# Patient Record
Sex: MALE | Race: WHITE | Employment: FULL TIME | URBAN - METROPOLITAN AREA
[De-identification: names, ages, dates, MRNs, and addresses within clinical notes are randomized per-mention and may not be internally consistent; named-entity substitution may affect disease eponyms.]

---

## 2018-01-02 ENCOUNTER — HOSPITAL ENCOUNTER (EMERGENCY)
Facility: CLINIC | Age: 53
Discharge: HOME | End: 2018-01-02
Attending: EMERGENCY MEDICINE | Admitting: EMERGENCY MEDICINE
Payer: COMMERCIAL

## 2018-01-02 VITALS
HEIGHT: 69 IN | HEART RATE: 66 BPM | DIASTOLIC BLOOD PRESSURE: 76 MMHG | RESPIRATION RATE: 20 BRPM | TEMPERATURE: 97.7 F | BODY MASS INDEX: 29.62 KG/M2 | WEIGHT: 200 LBS | SYSTOLIC BLOOD PRESSURE: 117 MMHG | OXYGEN SATURATION: 97 %

## 2018-01-02 DIAGNOSIS — R68.84 JAW PAIN: ICD-10-CM

## 2018-01-02 LAB
ANION GAP SERPL CALCULATED.3IONS-SCNC: 8 MMOL/L (ref 3–14)
BASOPHILS # BLD AUTO: 0 10E9/L (ref 0–0.2)
BASOPHILS NFR BLD AUTO: 0.3 %
BUN SERPL-MCNC: 25 MG/DL (ref 7–30)
CALCIUM SERPL-MCNC: 8.6 MG/DL (ref 8.5–10.1)
CHLORIDE SERPL-SCNC: 107 MMOL/L (ref 94–109)
CO2 SERPL-SCNC: 26 MMOL/L (ref 20–32)
CREAT SERPL-MCNC: 1.11 MG/DL (ref 0.66–1.25)
DIFFERENTIAL METHOD BLD: ABNORMAL
EOSINOPHIL # BLD AUTO: 0.1 10E9/L (ref 0–0.7)
EOSINOPHIL NFR BLD AUTO: 1.4 %
ERYTHROCYTE [DISTWIDTH] IN BLOOD BY AUTOMATED COUNT: 11.4 % (ref 10–15)
GFR SERPL CREATININE-BSD FRML MDRD: 70 ML/MIN/1.7M2
GLUCOSE SERPL-MCNC: 107 MG/DL (ref 70–99)
HCT VFR BLD AUTO: 43.8 % (ref 40–53)
HGB BLD-MCNC: 15.8 G/DL (ref 13.3–17.7)
IMM GRANULOCYTES # BLD: 0 10E9/L (ref 0–0.4)
IMM GRANULOCYTES NFR BLD: 0.2 %
INTERPRETATION ECG - MUSE: NORMAL
LYMPHOCYTES # BLD AUTO: 1.6 10E9/L (ref 0.8–5.3)
LYMPHOCYTES NFR BLD AUTO: 27.1 %
MCH RBC QN AUTO: 33.6 PG (ref 26.5–33)
MCHC RBC AUTO-ENTMCNC: 36.1 G/DL (ref 31.5–36.5)
MCV RBC AUTO: 93 FL (ref 78–100)
MONOCYTES # BLD AUTO: 0.4 10E9/L (ref 0–1.3)
MONOCYTES NFR BLD AUTO: 7.6 %
NEUTROPHILS # BLD AUTO: 3.7 10E9/L (ref 1.6–8.3)
NEUTROPHILS NFR BLD AUTO: 63.4 %
NRBC # BLD AUTO: 0 10*3/UL
NRBC BLD AUTO-RTO: 0 /100
PLATELET # BLD AUTO: 168 10E9/L (ref 150–450)
POTASSIUM SERPL-SCNC: 3.7 MMOL/L (ref 3.4–5.3)
RBC # BLD AUTO: 4.7 10E12/L (ref 4.4–5.9)
SODIUM SERPL-SCNC: 141 MMOL/L (ref 133–144)
TROPONIN I SERPL-MCNC: <0.015 UG/L (ref 0–0.04)
WBC # BLD AUTO: 5.8 10E9/L (ref 4–11)

## 2018-01-02 PROCEDURE — 85025 COMPLETE CBC W/AUTO DIFF WBC: CPT | Performed by: EMERGENCY MEDICINE

## 2018-01-02 PROCEDURE — 99284 EMERGENCY DEPT VISIT MOD MDM: CPT

## 2018-01-02 PROCEDURE — 84484 ASSAY OF TROPONIN QUANT: CPT | Performed by: EMERGENCY MEDICINE

## 2018-01-02 PROCEDURE — 93005 ELECTROCARDIOGRAM TRACING: CPT

## 2018-01-02 PROCEDURE — 80048 BASIC METABOLIC PNL TOTAL CA: CPT | Performed by: EMERGENCY MEDICINE

## 2018-01-02 ASSESSMENT — ENCOUNTER SYMPTOMS
VOMITING: 0
DIARRHEA: 0
NAUSEA: 0
SHORTNESS OF BREATH: 0
CHILLS: 0
FEVER: 0
WOUND: 0
COLOR CHANGE: 0
DIZZINESS: 0
ABDOMINAL PAIN: 0
FATIGUE: 0
SINUS PAIN: 0
LIGHT-HEADEDNESS: 0
DIFFICULTY URINATING: 0
HEMATURIA: 0
SINUS PRESSURE: 0
COUGH: 0
DYSURIA: 0
HEADACHES: 0

## 2018-01-02 NOTE — ED AVS SNAPSHOT
[facility] Emergency Department    [address]    [facility] [address]    Phone:  [phone]    Fax:  [phone]                                       Yoseph Cook   MRN: [id number]    Department:  [facility] Emergency Department   Date of Visit:  1/2/2018           Patient Information     Date Of Birth          1965        Your diagnoses for this visit were:     Jaw pain        You were seen by Amandeep Kennedy MD.      Follow-up Information     Call [facility].    Why:  call later this week if symptoms persisting/getting worse     Contact information:    [address]  [facility] [phone]        Follow up with [facility] Emergency Department.    Specialty:  EMERGENCY MEDICINE    Why:  As needed, If symptoms worsen    Contact information:    [address]  [facility] [id number] [phone]        Follow up with Rashard Pizarro MD.    Specialty:  Internal Medicine    Why:  As needed    Contact information:    [facility]  [address] [place] [address] [phone]        Discharge References/Attachments     PAIN, ACUTE, UNCERTAIN CAUSE (ENGLISH)    TRIGEMINAL NEURALGIA (ENGLISH)      24 Hour Appointment Hotline       To make an appointment at any [facility], call [phone] ([phone]). If you don't have a family doctor or clinic, we will help you find one. [place] clinics are conveniently located to serve the needs of you and your family.          ED Discharge Orders     Exercise Stress Echocardiogram       Administration of IV contrast will be tailored to this examination per the appropriate written protocol listed in the Echocardiography department Protocol Book, or by the supervising Cardiologist. This may result in an order change.    Use of contrast is at the discretion of the supervising Cardiologist.                     Review of your medicines       Our records show that you are taking the medicines listed below. If these are incorrect, please call your family doctor or clinic.        Dose / Directions Last dose taken    ASACOL 400 MG EC tablet   Generic drug:  mesalamine        2 TABLETS 3 TIMES DAILY   Refills:  0        aspirin 81 MG tablet        1 TABLET DAILY   Refills:  0        clobetasol 0.05 % external solution   Commonly known as:  TEMOVATE   Quantity:  1        apply as directed   Refills:  11        fish oil-omega-3 fatty acids 1000 MG capsule        2 tablets daily   Refills:  0        metoprolol 10 mg/mL Susp   Commonly known as:  LOPRESSOR        Take by mouth 2 times daily   Refills:  0        MULTI-DAY Tabs        Refills:  0        NIZORAL 2 % shampoo   Quantity:  1 bottle   Generic drug:  ketoconazole        apply as directed   Refills:  11                Procedures and tests performed during your visit     Basic metabolic panel    CBC with platelets differential    EKG 12 lead    Troponin I      Orders Needing Specimen Collection     None      Pending Results     No orders found from 12/31/2017 to 1/3/2018.            Pending Culture Results     No orders found from 12/31/2017 to 1/3/2018.            Pending Results Instructions     If you had any lab results that were not finalized at the time of your Discharge, you can call the ED Lab Result RN at [phone]. You will be contacted by this team for any positive Lab results or changes in treatment. The nurses are available 7 days a week from 10A to 6:30P.  You can leave a message 24 hours per day and they will return your call.        Test Results From Your Hospital Stay        1/2/2018  3:34 PM      Component Results     Component Value Ref Range & Units Status    WBC 5.8 4.0 - 11.0 10e9/L Final    RBC Count 4.70 4.4 - 5.9 10e12/L Final    Hemoglobin 15.8 13.3 - 17.7 g/dL Final    Hematocrit 43.8 40.0 - 53.0 % Final    MCV 93 78 - 100 fl Final    MCH 33.6 (H) 26.5 - 33.0 pg Final     MCHC 36.1 31.5 - 36.5 g/dL Final    RDW 11.4 10.0 - 15.0 % Final    Platelet Count 168 150 - 450 10e9/L Final    Diff Method Automated Method  Final    % Neutrophils 63.4 % Final    % Lymphocytes 27.1 % Final    % Monocytes 7.6 % Final    % Eosinophils 1.4 % Final    % Basophils 0.3 % Final    % Immature Granulocytes 0.2 % Final    Nucleated RBCs 0 0 /100 Final    Absolute Neutrophil 3.7 1.6 - 8.3 10e9/L Final    Absolute Lymphocytes 1.6 0.8 - 5.3 10e9/L Final    Absolute Monocytes 0.4 0.0 - 1.3 10e9/L Final    Absolute Eosinophils 0.1 0.0 - 0.7 10e9/L Final    Absolute Basophils 0.0 0.0 - 0.2 10e9/L Final    Abs Immature Granulocytes 0.0 0 - 0.4 10e9/L Final    Absolute Nucleated RBC 0.0  Final         1/2/2018  3:52 PM      Component Results     Component Value Ref Range & Units Status    Troponin I ES <0.015 0.000 - 0.045 ug/L Final    The 99th percentile for upper reference range is 0.045 ug/L.  Troponin values   in the range of 0.045 - 0.120 ug/L may be associated with risks of adverse   clinical events.           1/2/2018  3:52 PM      Component Results     Component Value Ref Range & Units Status    Sodium 141 133 - 144 mmol/L Final    Potassium 3.7 3.4 - 5.3 mmol/L Final    Chloride 107 94 - 109 mmol/L Final    Carbon Dioxide 26 20 - 32 mmol/L Final    Anion Gap 8 3 - 14 mmol/L Final    Glucose 107 (H) 70 - 99 mg/dL Final    Urea Nitrogen 25 7 - 30 mg/dL Final    Creatinine 1.11 0.66 - 1.25 mg/dL Final    GFR Estimate 70 >60 mL/min/1.7m2 Final    Non  GFR Calc    GFR Estimate If Black 84 >60 mL/min/1.7m2 Final    African American GFR Calc    Calcium 8.6 8.5 - 10.1 mg/dL Final                Clinical Quality Measure: Blood Pressure Screening     Your blood pressure was checked while you were in the emergency department today. The last reading we obtained was  BP: (!) 137/99 . Please read the guidelines below about what these numbers mean and what you should do about them.  If your systolic  blood pressure (the top number) is less than 120 and your diastolic blood pressure (the bottom number) is less than 80, then your blood pressure is normal. There is nothing more that you need to do about it.  If your systolic blood pressure (the top number) is 120-139 or your diastolic blood pressure (the bottom number) is 80-89, your blood pressure may be higher than it should be. You should have your blood pressure rechecked within a year by a primary care provider.  If your systolic blood pressure (the top number) is 140 or greater or your diastolic blood pressure (the bottom number) is 90 or greater, you may have high blood pressure. High blood pressure is treatable, but if left untreated over time it can put you at risk for heart attack, stroke, or kidney failure. You should have your blood pressure rechecked by a primary care provider within the next 4 weeks.  If your provider in the emergency department today gave you specific instructions to follow-up with your doctor or provider even sooner than that, you should follow that instruction and not wait for up to 4 weeks for your follow-up visit.        Thank you for choosing [facility]       Thank you for choosing [facility] for your care. Our goal is always to provide you with excellent care. Hearing back from our patients is one way we can continue to improve our services. Please take a few minutes to complete the written survey that you may receive in the mail after you visit with us. Thank you!        [employer] Information     [employer] gives you secure access to your electronic health record. If you see a primary care provider, you can also send messages to your care team and make appointments. If you have questions, please call your primary care clinic.  If you do not have a primary care provider, please call [phone] and they will assist you.        Care EveryWhere ID     This is your Care EveryWhere ID. This could be used by other organizations to access  your [facility] medical records  [id number]        Equal Access to Services     ROSETTA WILSON : [place], sandy tineo, [place]. So [facility] [phone].    ATENCIÓN: Si habla español, tiene a candelario disposición servicios gratuitos de asistencia lingüística. Llame al [phone].    We comply with applicable federal civil rights laws and [place] laws. We do not discriminate on the basis of race, color, national origin, age, disability, sex, sexual orientation, or gender identity.            After Visit Summary       This is your record. Keep this with you and show to your community pharmacist(s) and doctor(s) at your next visit.

## 2018-01-02 NOTE — ED PROVIDER NOTES
History     Chief Complaint:  Jaw Pain    HPI   Yoseph Cook is a 52 year old male who presents with jaw pain. The patient states that he woke up this morning at approximately 0800 with left sided jaw pain. The pain is sharp, stabbing, and intermittent in nature, that lasts for only a few seconds at a time. He reports that he originally thought that this was dental pain and visited a dentist to get an examination. At the dentist appointment today he had x-ray done and an examination with cool and warm air blowing onto his teeth with no significant findings. After this visit, his pain migrated to the upper portion of his jaw and further back as well. He states that his jaw is sensitive to cool air but other than this has no other symptoms. Of note, he states that he is a  but he has not flown in 10 days. There was no reported teeth grinding, trauma, fevers, abscess, bony deformities, discharge, foul tastes, or any other symptoms. Of note, he did just get over a cold approximately 2 days prior to today.    Allergies:  No known drug allergies.     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   HENT: Positive for dental problem. Negative for congestion, mouth sores, sinus pain and sinus pressure.    Eyes: Negative for visual disturbance.   Respiratory: Negative for cough and shortness of breath.    Gastrointestinal: Negative for abdominal pain, diarrhea, nausea and vomiting.   Genitourinary: Negative for difficulty urinating, dysuria and hematuria.   Skin: Negative for color change and wound.   Neurological: Negative for dizziness, light-headedness and headaches.   All other systems reviewed and are negative.    Physical Exam     Patient Vitals for the past 24 hrs:   BP Temp Temp src Pulse Resp SpO2 Height Weight   01/02/18 1507 (!) 137/99 97.7  F (36.5  C) Temporal 66 20 99 % 1.753 m (5' 9\") 90.7 kg (200 lb)         Physical Exam  General: Patient is alert and interactive when I enter the room  Head:  The scalp, face, and head appear normal  Eyes:  Conjunctivae are normal  ENT:    No lesions/abscesses intraorally. No reproducible pain to palpation. No salivary stones.   Neck:  Trachea midline. No adenopathy.   CV:  Normal rate  Resp:  No respiratory distress   Musc:  Normal muscular tone    No major joint effusions    No asymmetric leg swelling    Good capillary refill noted  Skin:  No rash or lesions noted  Neuro: Speech is normal and fluent. Face is symmetric.     Moving all extremities well.   Psych:  Awake. Alert.  Normal affect.  Appropriate interactions.            Emergency Department Course   ECG:  @ 1501  Indication: Jaw Pain  Vent. Rate 60 bpm. IL interval 166 ms. QRS duration 98 ms. QT/QTc 398/398 ms. P-R-T axis 64 44 47.   Normal Sinus rhythm. Normal ECG.  No significant change when compared to previous ECG from 11/9/12   Read @ 1716 by Dr. Kennedy.    Laboratory:  CBC:  WBC 5.8, HGB 15.8, ,  BMP: Glucose 107 (H), otherwise WNL (Creatinine 1.11)  (1515) Troponin I: <0.015    Emergency Department Course:  Nursing notes and vitals reviewed.  (6555) I performed an exam of the " patient as documented above.    Blood was drawn from the patient. This was sent for laboratory testing, findings above.   EKG was done, interpretation as above.    Findings and plan explained to the patient. Patient discharged home with instructions regarding supportive care, medications, and reasons to return. The importance of close follow-up was reviewed.  Impression & Plan    Medical Decision Making:  Yoseph Cook is a 52 year old male who came in for jaw pain that started this morning. Upon my presentation, the patients pain was not present. Examination showed no signs of abscess, no facial swelling, no signs of trauma. There is no indication for advanced imaging at this time. Patient has already seen a dentist prior to his presentation in the ED today where x-rays confirmed no indication of fracture or other etiology that could be causing his pain. There was some concern for cardiac etiology as the patient has a history of cardiac complications. EKG and blood work here were both negative for any acute coronary syndrome and therefore believe I can rule out serious cardiac etiology at this point. Given that patient has no fever, abscess, or any other significant findings, I believe the patient can be discharged with outpatient stress testing, as his last was 2 years prior. He will be instructed to follow up with his primary care physician for further workup as well as referral to ENT, and possibly neurology, in the event that this may be related to trigeminal nerve pathology. Overall I suspect pt is suffering from trigeminal neuralgia, but pt declines treatment with carbamazepine or gabapentin at this time due to his job. Patient discharged in stable condition. All questions answered prior to discharge.     Diagnosis:    ICD-10-CM   1. Jaw pain R68.84       Disposition:  Patient is discharged to home.        Jared ACE, am serving as a scribe on 1/2/2018 at 4:48 PM to personally document services  performed by Dr. Kennedy based on my observations and the provider's statements to me.        Jared Fine  1/2/2018   [facility] EMERGENCY DEPARTMENT       Amandeep Kennedy MD  01/04/18 0124

## 2018-01-02 NOTE — ED NOTES
ABCs intact. Pt c/o L lower jaw pain for about a week. Pt went to his dentist today but his teeth were okay. Pt is an . Denies SOB or CP.

## 2018-01-02 NOTE — ED AVS SNAPSHOT
[facility] Emergency Department    [address]    [facility] [address]    Phone:  [phone]    Fax:  [phone]                                       Yoseph Cook   MRN: [id number]    Department:  [facility] Emergency Department   Date of Visit:  1/2/2018           After Visit Summary Signature Page     I have received my discharge instructions, and my questions have been answered. I have discussed any challenges I see with this plan with the nurse or doctor.    ..........................................................................................................................................  Patient/Patient Representative Signature      ..........................................................................................................................................  Patient Representative Print Name and Relationship to Patient    ..................................................               ................................................  Date                                            Time    ..........................................................................................................................................  Reviewed by Signature/Title    ...................................................              ..............................................  Date                                                            Time

## 2018-01-29 ENCOUNTER — OFFICE VISIT (OUTPATIENT)
Dept: URGENT CARE | Facility: URGENT CARE | Age: 53
End: 2018-01-29
Payer: COMMERCIAL

## 2018-01-29 VITALS
WEIGHT: 200 LBS | OXYGEN SATURATION: 98 % | HEART RATE: 58 BPM | TEMPERATURE: 97.7 F | SYSTOLIC BLOOD PRESSURE: 115 MMHG | BODY MASS INDEX: 29.53 KG/M2 | DIASTOLIC BLOOD PRESSURE: 78 MMHG

## 2018-01-29 DIAGNOSIS — J20.9 ACUTE BRONCHITIS, UNSPECIFIED ORGANISM: ICD-10-CM

## 2018-01-29 DIAGNOSIS — J10.1 INFLUENZA A: Primary | ICD-10-CM

## 2018-01-29 LAB
FLUAV+FLUBV AG SPEC QL: NEGATIVE
FLUAV+FLUBV AG SPEC QL: POSITIVE
SPECIMEN SOURCE: ABNORMAL

## 2018-01-29 PROCEDURE — 99203 OFFICE O/P NEW LOW 30 MIN: CPT | Performed by: PHYSICIAN ASSISTANT

## 2018-01-29 PROCEDURE — 87804 INFLUENZA ASSAY W/OPTIC: CPT | Performed by: PHYSICIAN ASSISTANT

## 2018-01-29 NOTE — NURSING NOTE
"Chief Complaint   Patient presents with     Urgent Care     Cough     Pt states was dx with bronchitis x 4 days ago but having fever and vertigo.        Initial /78 (BP Location: Right arm, Patient Position: Chair, Cuff Size: Adult Large)  Pulse 58  Temp 97.7  F (36.5  C) (Tympanic)  Wt 200 lb (90.7 kg)  SpO2 98%  BMI 29.53 kg/m2 Estimated body mass index is 29.53 kg/(m^2) as calculated from the following:    Height as of 1/2/18: 5' 9\" (1.753 m).    Weight as of this encounter: 200 lb (90.7 kg).  Medication Reconciliation: unable or not appropriate to perform   Suman Cantrell CMA (AAMA) 1/29/2018 9:59 AM    "

## 2018-01-29 NOTE — MR AVS SNAPSHOT
After Visit Summary   1/29/2018    Yoseph Cook    MRN: [id number]           Patient Information     Date Of Birth          1965        Visit Information        Provider Department      1/29/2018 9:40 AM Najma Lewis PA-C [facility] Urgent [facility]        Today's Diagnoses     Influenza A    -  1    Acute bronchitis, unspecified organism           Follow-ups after your visit        Who to contact     If you have questions or need follow up information about today's clinic visit or your schedule please contact [facility] URGENT CARE directly at [phone].  Normal or non-critical lab and imaging results will be communicated to you by [employer], letter or phone within 4 business days after the clinic has received the results. If you do not hear from us within 7 days, please contact the clinic through [employer] or phone. If you have a critical or abnormal lab result, we will notify you by phone as soon as possible.  Submit refill requests through [employer] or call your pharmacy and they will forward the refill request to us. Please allow 3 business days for your refill to be completed.          Additional Information About Your Visit        MyChart Information     [employer] gives you secure access to your electronic health record. If you see a primary care provider, you can also send messages to your care team and make appointments. If you have questions, please call your primary care clinic.  If you do not have a primary care provider, please call [phone] and they will assist you.        Care EveryWhere ID     This is your Care EveryWhere ID. This could be used by other organizations to access your [place] medical records  [id number]        Your Vitals Were     Pulse Temperature Pulse Oximetry BMI (Body Mass Index)          58 97.7  F (36.5  C) (Tympanic) 98% 29.53 kg/m2         Blood Pressure from Last 3 Encounters:   01/29/18 115/78   01/02/18 117/76   10/07/16 (!) 130/93     Weight from Last 3 Encounters:   01/29/18 200 lb (90.7 kg)   01/02/18 200 lb (90.7 kg)   10/07/16 200 lb (90.7 kg)              We Performed the Following     Influenza A/B antigen        Primary Care Provider Office Phone # Fax #    Rashard Brian Pizarro [phone]       [facility] [address] [place] [address]        Equal Access to Services     : Hadii aad ku hadasho Soomaali, waaxda luqadaha, qaybta kaalmada adeegyada, waxay idiin hayaan adeeg kharasedrick la'teo . So [facility] [phone].    ATENCIÓN: Si habla español, tiene a candelario disposición servicios gratuitos de asistencia lingüística. [facility] [phone].    We comply with applicable federal civil rights laws and [place] laws. We do not discriminate on the basis of race, color, national origin, age, disability, sex, sexual orientation, or gender identity.            Thank you!     Thank you for choosing [facility] URGENT CARE  for your care. Our goal is always to provide you with excellent care. Hearing back from our patients is one way we can continue to improve our services. Please take a few minutes to complete the written survey that you may receive in the mail after your visit with us. Thank you!             Your Updated Medication List - Protect others around you: Learn how to safely use, store and throw away your medicines at www.disposemymeds.org.          This list is accurate as of 1/29/18 10:48 AM.  Always use your most recent med list.                   Brand Name Dispense Instructions for use Diagnosis    ASACOL 400 MG EC tablet   Generic drug:  mesalamine      2 TABLETS 3 TIMES DAILY    Regional enteritis of small intestine (H)       aspirin 81 MG tablet      1 TABLET DAILY    Routine general medical examination at a health care facility       clobetasol 0.05 % external solution    TEMOVATE    1    apply as directed    Other specified disease of hair and hair follicles       fish oil-omega-3 fatty  acids 1000 MG capsule      2 tablets daily        metoprolol 10 mg/mL Susp    LOPRESSOR     Take by mouth 2 times daily        MULTI-DAY Tabs           NIZORAL 2 % shampoo   Generic drug:  ketoconazole     1 bottle    apply as directed    Other specified disease of hair and hair follicles

## 2018-01-29 NOTE — PROGRESS NOTES
SUBJECTIVE:  Yoseph Cook is a 52 year old male who presents to the clinic today with a chief complaint of cough  and wheezing for 5 day(s).  His cough is described as persistent, nonproductive and wheezing.    The patient's symptoms are moderate and improving.  Associated symptoms include fever. Patient is concerned because he continues to have a fever.The patient's symptoms are exacerbated by no particular triggers  Patient has been using ibuprofen  to improve symptoms. He was seen on Friday and been taking Azithromycin and was given a steroid shot. He has been feeling better but is concerned about having a fever.     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     Atrial fibrillation (H)      Cervicalgia 9/2003    C5 disc herniation     Colitis      Diverticulitis        Current Outpatient Prescriptions   Medication Sig Dispense Refill     metoprolol (LOPRESSOR) 10 mg/mL Take by mouth 2 times daily       NIZORAL 2 % EX SHAM apply as directed 1 bottle 11     CLOBETASOL PROPIONATE 0.05 % EX SOLN apply as directed 1 11     ASACOL 400 MG OR TBEC 2 TABLETS 3 TIMES DAILY       ASPIRIN 81 MG OR TABS 1 TABLET DAILY       FISH OIL 1000 MG OR CAPS 2 tablets daily  0     MULTI-DAY OR TABS          Social History   Substance Use Topics     Smoking status: Former Smoker     Smokeless tobacco: Never Used     Alcohol use Yes      Comment:  pack per month     ROS:  C: POSITIVE for fever  INTEGUMENTARY/SKIN: NEGATIVE for worrisome rashes, moles or lesions  E/M: NEGATIVE for sore throat, ear or sinus problems  R: POSITIVE for cough  CV: NEGATIVE for chest pain, palpitations or peripheral edema  GI: NEGATIVE for nausea, abdominal pain, heartburn, or change in bowel habits  HEME/ALLERGY/IMMUNE: NEGATIVE for swollen nodes      OBJECTIVE:  /78 (BP  Location: Right arm, Patient Position: Chair, Cuff Size: Adult Large)  Pulse 58  Temp 97.7  F (36.5  C) (Tympanic)  Wt 200 lb (90.7 kg)  SpO2 98%  BMI 29.53 kg/m2  GENERAL APPEARANCE: healthy, alert and no distress  EYES: EOMI,  PERRL, conjunctiva clear  HENT: ear canals and TM's normal.  Nose and mouth without ulcers, erythema or lesions  NECK: supple, nontender, no lymphadenopathy  RESP: expiratory wheezes upper fields  CV: regular rates and rhythm, normal S1 S2, no murmur noted  NEURO: Normal strength and tone, sensory exam grossly normal,  normal speech and mentation  SKIN: no suspicious lesions or rashes    Results for orders placed or performed in visit on 01/29/18   Influenza A/B antigen   Result Value Ref Range    Influenza A/B Agn Specimen Nasal     Influenza A Positive (A) NEG^Negative    Influenza B Negative NEG^Negative       ASSESSMENT/PLAN:  1. Influenza A  Out of the window for Tamiflu. Went over contact precautions.    2. Acute bronchitis, unspecified organism  Symptomatic measures encouraged, humidified air, plenty of fluids.  Continue taking Azithromycin, RTC if wheezing re-presents.   I have discussed the patient's diagnosis and my plan of treatment with the patient. We went over any labs or imaging. Patient is aware to come back in with worsening symptoms or if no relief despite treatment plan.  Patient verbalizes understanding. All questions were addressed and answered.     Najma Lewis PA-C

## 2018-02-19 ENCOUNTER — RADIANT APPOINTMENT (OUTPATIENT)
Dept: GENERAL RADIOLOGY | Facility: CLINIC | Age: 53
End: 2018-02-19
Attending: PHYSICIAN ASSISTANT
Payer: COMMERCIAL

## 2018-02-19 ENCOUNTER — OFFICE VISIT (OUTPATIENT)
Dept: URGENT CARE | Facility: URGENT CARE | Age: 53
End: 2018-02-19
Payer: COMMERCIAL

## 2018-02-19 VITALS
HEART RATE: 64 BPM | TEMPERATURE: 97.9 F | DIASTOLIC BLOOD PRESSURE: 68 MMHG | OXYGEN SATURATION: 97 % | WEIGHT: 203.3 LBS | BODY MASS INDEX: 30.02 KG/M2 | RESPIRATION RATE: 20 BRPM | SYSTOLIC BLOOD PRESSURE: 104 MMHG

## 2018-02-19 DIAGNOSIS — R05.9 COUGH: ICD-10-CM

## 2018-02-19 DIAGNOSIS — J06.9 VIRAL UPPER RESPIRATORY TRACT INFECTION: Primary | ICD-10-CM

## 2018-02-19 PROCEDURE — 71046 X-RAY EXAM CHEST 2 VIEWS: CPT

## 2018-02-19 PROCEDURE — 99213 OFFICE O/P EST LOW 20 MIN: CPT | Performed by: PHYSICIAN ASSISTANT

## 2018-02-19 RX ORDER — BENZONATATE 200 MG/1
200 CAPSULE ORAL 3 TIMES DAILY PRN
Qty: 21 CAPSULE | Refills: 0 | Status: SHIPPED | OUTPATIENT
Start: 2018-02-19 | End: 2019-07-11

## 2018-02-19 RX ORDER — ALBUTEROL SULFATE 90 UG/1
2 AEROSOL, METERED RESPIRATORY (INHALATION) EVERY 6 HOURS PRN
Qty: 1 INHALER | Refills: 0 | Status: SHIPPED | OUTPATIENT
Start: 2018-02-19 | End: 2019-12-20

## 2018-02-19 ASSESSMENT — ENCOUNTER SYMPTOMS
HEMOPTYSIS: 0
NAUSEA: 0
SORE THROAT: 0
MYALGIAS: 0
FEVER: 0
SINUS PAIN: 0
COUGH: 1
SPUTUM PRODUCTION: 0
WHEEZING: 1
VOMITING: 0
CHILLS: 0
HEARTBURN: 0
SHORTNESS OF BREATH: 0
HEADACHES: 0
PALPITATIONS: 0

## 2018-02-19 NOTE — PROGRESS NOTES
SUBJECTIVE:   Yoseph Cook is a 52 year old male presenting with a chief complaint of   Chief Complaint   Patient presents with     Urgent Care     URI     Deep Ratling in chest While Coughing x One Week    .    Onset of symptoms was 1 week(s) ago.  Course of illness is worsening.    Severity moderate  Current and Associated symptoms: cough - non-productive and wheezing  Treatment measures tried include Fluids and Rest.  Predisposing factors include None. Recent influenza     Patient works as a , flying between  and [place]        Review of Systems   Constitutional: Positive for malaise/fatigue. Negative for chills and fever.   HENT: Negative for congestion, ear pain, sinus pain and sore throat.    Respiratory: Positive for cough and wheezing. Negative for hemoptysis, sputum production and shortness of breath.    Cardiovascular: Negative for chest pain and palpitations.   Gastrointestinal: Negative for heartburn, nausea and vomiting.   Musculoskeletal: Negative for myalgias.   Skin: Negative for rash.   Neurological: Negative for headaches.         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     Atrial fibrillation (H)      Cervicalgia 9/2003    C5 disc herniation     Colitis      Diverticulitis      Current Outpatient Prescriptions   Medication Sig Dispense Refill     OMEPRAZOLE PO        albuterol (PROAIR HFA/PROVENTIL HFA/VENTOLIN HFA) 108 (90 BASE) MCG/ACT Inhaler Inhale 2 puffs into the lungs every 6 hours as needed for shortness of breath / dyspnea or wheezing 1 Inhaler 0     benzonatate (TESSALON) 200 MG capsule Take 1 capsule (200 mg) by mouth 3 times daily as needed for cough 21 capsule 0     metoprolol (LOPRESSOR) 10 mg/mL Take by mouth 2 times daily       ASPIRIN 81 MG OR TABS 1 TABLET DAILY       FISH OIL 1000 MG OR CAPS  2 tablets daily  0     MULTI-DAY OR TABS        NIZORAL 2 % EX SHAM apply as directed (Patient not taking: No sig reported) 1 bottle 11     CLOBETASOL PROPIONATE 0.05 % EX SOLN apply as directed (Patient not taking: No sig reported) 1 11     ASACOL 400 MG OR TBEC 2 TABLETS 3 TIMES DAILY       Social History   Substance Use Topics     Smoking status: Former Smoker     Smokeless tobacco: Never Used     Alcohol use Yes      Comment:  pack per month       OBJECTIVE  /68 (BP Location: Right arm, Patient Position: Sitting, Cuff Size: Adult Large)  Pulse 64  Temp 97.9  F (36.6  C) (Tympanic)  Resp 20  Wt 203 lb 4.8 oz (92.2 kg)  SpO2 97%  BMI 30.02 kg/m2    Physical Exam   Constitutional: He is well-developed, well-nourished, and in no distress. No distress.   HENT:   Right Ear: Tympanic membrane and ear canal normal.   Left Ear: Tympanic membrane and ear canal normal.   Mouth/Throat: Mucous membranes are not dry. Posterior oropharyngeal erythema present. No oropharyngeal exudate or tonsillar abscesses.   Eyes: EOM are normal. Right eye exhibits no discharge. Left eye exhibits no discharge.   Neck: Normal range of motion. Neck supple.   Cardiovascular: Normal rate, regular rhythm and normal heart sounds.    Pulmonary/Chest: Effort normal and breath sounds normal. No respiratory distress. He has no wheezes. He has no rales.   Lymphadenopathy:     He has no cervical adenopathy.   Neurological: He is alert. No cranial nerve deficit. Gait normal.   Skin: Skin is warm and dry. No rash noted. He is not diaphoretic.   Psychiatric: Mood and affect normal.       Labs:  No results found for this or any previous visit (from the past 24 hour(s)).    X-Ray was done, my findings are: normal    ASSESSMENT:      ICD-10-CM    1. Viral upper respiratory tract infection J06.9     B97.89    2. Cough R05 XR Chest 2 Views     albuterol (PROAIR HFA/PROVENTIL HFA/VENTOLIN HFA) 108 (90 BASE) MCG/ACT Inhaler     benzonatate (TESSALON)  200 MG capsule        Medical Decision Making:    Differential Diagnosis:  Pneumonia, Bronchitis    Serious Comorbid Conditions:  Adult:  None    PLAN:    URI Adult:  Fluids, Rest and Claritin and Mucinex as well as above RX    Followup:    If not improving or if condition worsens, follow up with your Primary Care Provider    Najma Lewis PA-C

## 2018-02-19 NOTE — LETTER
[facility]  [address]  [address]  [place] [address]  Phone: [phone]  Fax: [phone]    February 19, 2018        Yoseph Cook  [address] [place] [facility] [address]          To whom it may concern:    RE: Yoseph Cook    Patient was seen and treated today at our clinic and missed work. Please excuse him 2/19/2018 - 2/21/2018.     Please contact me for questions or concerns.      Sincerely,        Najma Lewis PA-C

## 2019-07-11 ENCOUNTER — APPOINTMENT (OUTPATIENT)
Dept: GENERAL RADIOLOGY | Facility: CLINIC | Age: 54
End: 2019-07-11
Attending: EMERGENCY MEDICINE
Payer: COMMERCIAL

## 2019-07-11 ENCOUNTER — HOSPITAL ENCOUNTER (INPATIENT)
Facility: CLINIC | Age: 54
LOS: 1 days | Discharge: HOME | End: 2019-07-12
Attending: EMERGENCY MEDICINE | Admitting: INTERNAL MEDICINE
Payer: COMMERCIAL

## 2019-07-11 DIAGNOSIS — I21.4 NSTEMI (NON-ST ELEVATED MYOCARDIAL INFARCTION) (H): ICD-10-CM

## 2019-07-11 LAB
ALBUMIN SERPL-MCNC: 3.9 G/DL (ref 3.4–5)
ALP SERPL-CCNC: 57 U/L (ref 40–150)
ALT SERPL W P-5'-P-CCNC: 33 U/L (ref 0–70)
ANION GAP SERPL CALCULATED.3IONS-SCNC: 8 MMOL/L (ref 3–14)
AST SERPL W P-5'-P-CCNC: 19 U/L (ref 0–45)
BASOPHILS # BLD AUTO: 0 10E9/L (ref 0–0.2)
BASOPHILS NFR BLD AUTO: 0.5 %
BILIRUB SERPL-MCNC: 0.7 MG/DL (ref 0.2–1.3)
BUN SERPL-MCNC: 25 MG/DL (ref 7–30)
CALCIUM SERPL-MCNC: 8.4 MG/DL (ref 8.5–10.1)
CHLORIDE SERPL-SCNC: 109 MMOL/L (ref 94–109)
CO2 SERPL-SCNC: 24 MMOL/L (ref 20–32)
CREAT SERPL-MCNC: 1 MG/DL (ref 0.66–1.25)
DIFFERENTIAL METHOD BLD: NORMAL
EOSINOPHIL # BLD AUTO: 0.1 10E9/L (ref 0–0.7)
EOSINOPHIL NFR BLD AUTO: 1.5 %
ERYTHROCYTE [DISTWIDTH] IN BLOOD BY AUTOMATED COUNT: 11.7 % (ref 10–15)
ERYTHROCYTE [DISTWIDTH] IN BLOOD BY AUTOMATED COUNT: 11.8 % (ref 10–15)
GFR SERPL CREATININE-BSD FRML MDRD: 85 ML/MIN/{1.73_M2}
GLUCOSE SERPL-MCNC: 95 MG/DL (ref 70–99)
HCT VFR BLD AUTO: 42.3 % (ref 40–53)
HCT VFR BLD AUTO: 42.9 % (ref 40–53)
HGB BLD-MCNC: 15 G/DL (ref 13.3–17.7)
HGB BLD-MCNC: 15.1 G/DL (ref 13.3–17.7)
IMM GRANULOCYTES # BLD: 0 10E9/L (ref 0–0.4)
IMM GRANULOCYTES NFR BLD: 0.2 %
INTERPRETATION ECG - MUSE: NORMAL
LMWH PPP CHRO-ACNC: 0.14 IU/ML
LYMPHOCYTES # BLD AUTO: 1.1 10E9/L (ref 0.8–5.3)
LYMPHOCYTES NFR BLD AUTO: 27.2 %
MCH RBC QN AUTO: 33 PG (ref 26.5–33)
MCH RBC QN AUTO: 33.5 PG (ref 26.5–33)
MCHC RBC AUTO-ENTMCNC: 35.2 G/DL (ref 31.5–36.5)
MCHC RBC AUTO-ENTMCNC: 35.5 G/DL (ref 31.5–36.5)
MCV RBC AUTO: 93 FL (ref 78–100)
MCV RBC AUTO: 95 FL (ref 78–100)
MONOCYTES # BLD AUTO: 0.5 10E9/L (ref 0–1.3)
MONOCYTES NFR BLD AUTO: 11.7 %
NEUTROPHILS # BLD AUTO: 2.4 10E9/L (ref 1.6–8.3)
NEUTROPHILS NFR BLD AUTO: 58.9 %
NRBC # BLD AUTO: 0 10*3/UL
NRBC BLD AUTO-RTO: 0 /100
PLATELET # BLD AUTO: 161 10E9/L (ref 150–450)
PLATELET # BLD AUTO: 170 10E9/L (ref 150–450)
POTASSIUM SERPL-SCNC: 3.6 MMOL/L (ref 3.4–5.3)
PROT SERPL-MCNC: 7 G/DL (ref 6.8–8.8)
RBC # BLD AUTO: 4.51 10E12/L (ref 4.4–5.9)
RBC # BLD AUTO: 4.54 10E12/L (ref 4.4–5.9)
SODIUM SERPL-SCNC: 141 MMOL/L (ref 133–144)
TROPONIN I SERPL-MCNC: 0.03 UG/L (ref 0–0.04)
TROPONIN I SERPL-MCNC: 0.05 UG/L (ref 0–0.04)
TROPONIN I SERPL-MCNC: <0.015 UG/L (ref 0–0.04)
WBC # BLD AUTO: 4 10E9/L (ref 4–11)
WBC # BLD AUTO: 5.6 10E9/L (ref 4–11)

## 2019-07-11 PROCEDURE — 96365 THER/PROPH/DIAG IV INF INIT: CPT

## 2019-07-11 PROCEDURE — 25000132 ZZH RX MED GY IP 250 OP 250 PS 637: Performed by: INTERNAL MEDICINE

## 2019-07-11 PROCEDURE — 25000128 H RX IP 250 OP 636: Performed by: EMERGENCY MEDICINE

## 2019-07-11 PROCEDURE — 36415 COLL VENOUS BLD VENIPUNCTURE: CPT | Performed by: INTERNAL MEDICINE

## 2019-07-11 PROCEDURE — 85520 HEPARIN ASSAY: CPT | Performed by: INTERNAL MEDICINE

## 2019-07-11 PROCEDURE — 99222 1ST HOSP IP/OBS MODERATE 55: CPT | Mod: AI | Performed by: INTERNAL MEDICINE

## 2019-07-11 PROCEDURE — 84484 ASSAY OF TROPONIN QUANT: CPT | Performed by: INTERNAL MEDICINE

## 2019-07-11 PROCEDURE — 25800030 ZZH RX IP 258 OP 636: Performed by: INTERNAL MEDICINE

## 2019-07-11 PROCEDURE — 96376 TX/PRO/DX INJ SAME DRUG ADON: CPT

## 2019-07-11 PROCEDURE — 12000000 ZZH R&B MED SURG/OB

## 2019-07-11 PROCEDURE — 80053 COMPREHEN METABOLIC PANEL: CPT | Performed by: EMERGENCY MEDICINE

## 2019-07-11 PROCEDURE — 71046 X-RAY EXAM CHEST 2 VIEWS: CPT

## 2019-07-11 PROCEDURE — 25000132 ZZH RX MED GY IP 250 OP 250 PS 637: Performed by: EMERGENCY MEDICINE

## 2019-07-11 PROCEDURE — 99285 EMERGENCY DEPT VISIT HI MDM: CPT | Mod: 25

## 2019-07-11 PROCEDURE — 85027 COMPLETE CBC AUTOMATED: CPT | Performed by: INTERNAL MEDICINE

## 2019-07-11 PROCEDURE — 84484 ASSAY OF TROPONIN QUANT: CPT | Performed by: EMERGENCY MEDICINE

## 2019-07-11 PROCEDURE — 85025 COMPLETE CBC W/AUTO DIFF WBC: CPT | Performed by: EMERGENCY MEDICINE

## 2019-07-11 PROCEDURE — 93005 ELECTROCARDIOGRAM TRACING: CPT

## 2019-07-11 RX ORDER — PROCHLORPERAZINE 25 MG
25 SUPPOSITORY, RECTAL RECTAL EVERY 12 HOURS PRN
Status: DISCONTINUED | OUTPATIENT
Start: 2019-07-11 | End: 2019-07-12 | Stop reason: HOSPADM

## 2019-07-11 RX ORDER — METOPROLOL SUCCINATE 25 MG/1
25 TABLET, EXTENDED RELEASE ORAL DAILY
COMMUNITY
End: 2019-07-11

## 2019-07-11 RX ORDER — METOPROLOL SUCCINATE 50 MG/1
50 TABLET, EXTENDED RELEASE ORAL DAILY
Status: DISCONTINUED | OUTPATIENT
Start: 2019-07-11 | End: 2019-07-12 | Stop reason: HOSPADM

## 2019-07-11 RX ORDER — OXYCODONE HYDROCHLORIDE 5 MG/1
5 TABLET ORAL EVERY 4 HOURS PRN
Status: DISCONTINUED | OUTPATIENT
Start: 2019-07-11 | End: 2019-07-12 | Stop reason: HOSPADM

## 2019-07-11 RX ORDER — AMOXICILLIN 250 MG
1 CAPSULE ORAL 2 TIMES DAILY PRN
Status: DISCONTINUED | OUTPATIENT
Start: 2019-07-11 | End: 2019-07-12 | Stop reason: HOSPADM

## 2019-07-11 RX ORDER — ACETAMINOPHEN 325 MG/1
650 TABLET ORAL EVERY 4 HOURS PRN
Status: DISCONTINUED | OUTPATIENT
Start: 2019-07-11 | End: 2019-07-12 | Stop reason: ALTCHOICE

## 2019-07-11 RX ORDER — NALOXONE HYDROCHLORIDE 0.4 MG/ML
.1-.4 INJECTION, SOLUTION INTRAMUSCULAR; INTRAVENOUS; SUBCUTANEOUS
Status: DISCONTINUED | OUTPATIENT
Start: 2019-07-11 | End: 2019-07-12 | Stop reason: HOSPADM

## 2019-07-11 RX ORDER — LIDOCAINE 40 MG/G
CREAM TOPICAL
Status: DISCONTINUED | OUTPATIENT
Start: 2019-07-11 | End: 2019-07-12 | Stop reason: HOSPADM

## 2019-07-11 RX ORDER — METOPROLOL SUCCINATE 50 MG/1
50 TABLET, EXTENDED RELEASE ORAL DAILY
Status: ON HOLD | COMMUNITY
End: 2019-12-20

## 2019-07-11 RX ORDER — ASPIRIN 81 MG/1
81 TABLET ORAL DAILY
Status: DISCONTINUED | OUTPATIENT
Start: 2019-07-12 | End: 2019-07-12 | Stop reason: HOSPADM

## 2019-07-11 RX ORDER — ASPIRIN 81 MG/1
81 TABLET ORAL DAILY
COMMUNITY
End: 2019-12-20

## 2019-07-11 RX ORDER — SODIUM CHLORIDE 9 MG/ML
INJECTION, SOLUTION INTRAVENOUS CONTINUOUS
Status: DISCONTINUED | OUTPATIENT
Start: 2019-07-11 | End: 2019-07-12 | Stop reason: HOSPADM

## 2019-07-11 RX ORDER — MULTIPLE VITAMINS W/ MINERALS TAB 9MG-400MCG
1 TAB ORAL DAILY
Status: DISCONTINUED | OUTPATIENT
Start: 2019-07-11 | End: 2019-07-12 | Stop reason: HOSPADM

## 2019-07-11 RX ORDER — AMOXICILLIN 250 MG
2 CAPSULE ORAL 2 TIMES DAILY PRN
Status: DISCONTINUED | OUTPATIENT
Start: 2019-07-11 | End: 2019-07-12 | Stop reason: HOSPADM

## 2019-07-11 RX ORDER — PROCHLORPERAZINE MALEATE 5 MG
10 TABLET ORAL EVERY 6 HOURS PRN
Status: DISCONTINUED | OUTPATIENT
Start: 2019-07-11 | End: 2019-07-12 | Stop reason: HOSPADM

## 2019-07-11 RX ORDER — MULTIPLE VITAMINS W/ MINERALS TAB 9MG-400MCG
1 TAB ORAL DAILY
COMMUNITY

## 2019-07-11 RX ORDER — ATORVASTATIN CALCIUM 10 MG/1
10 TABLET, FILM COATED ORAL EVERY EVENING
Status: DISCONTINUED | OUTPATIENT
Start: 2019-07-11 | End: 2019-07-12 | Stop reason: HOSPADM

## 2019-07-11 RX ORDER — ASPIRIN 81 MG/1
324 TABLET, CHEWABLE ORAL ONCE
Status: COMPLETED | OUTPATIENT
Start: 2019-07-11 | End: 2019-07-11

## 2019-07-11 RX ORDER — POLYETHYLENE GLYCOL 3350 17 G/17G
17 POWDER, FOR SOLUTION ORAL DAILY PRN
Status: DISCONTINUED | OUTPATIENT
Start: 2019-07-11 | End: 2019-07-12 | Stop reason: HOSPADM

## 2019-07-11 RX ORDER — HYDROMORPHONE HYDROCHLORIDE 1 MG/ML
0.2 INJECTION, SOLUTION INTRAMUSCULAR; INTRAVENOUS; SUBCUTANEOUS
Status: DISCONTINUED | OUTPATIENT
Start: 2019-07-11 | End: 2019-07-12 | Stop reason: HOSPADM

## 2019-07-11 RX ORDER — LIDOCAINE 40 MG/G
CREAM TOPICAL
Status: DISCONTINUED | OUTPATIENT
Start: 2019-07-11 | End: 2019-07-11

## 2019-07-11 RX ORDER — BISACODYL 10 MG
10 SUPPOSITORY, RECTAL RECTAL DAILY PRN
Status: DISCONTINUED | OUTPATIENT
Start: 2019-07-11 | End: 2019-07-12 | Stop reason: HOSPADM

## 2019-07-11 RX ORDER — NITROGLYCERIN 0.4 MG/1
0.4 TABLET SUBLINGUAL EVERY 5 MIN PRN
Status: DISCONTINUED | OUTPATIENT
Start: 2019-07-11 | End: 2019-07-12 | Stop reason: HOSPADM

## 2019-07-11 RX ORDER — ASPIRIN 81 MG/1
243 TABLET, CHEWABLE ORAL ONCE
Status: DISCONTINUED | OUTPATIENT
Start: 2019-07-11 | End: 2019-07-11

## 2019-07-11 RX ADMIN — Medication 2400 UNITS: at 23:54

## 2019-07-11 RX ADMIN — MULTIPLE VITAMINS W/ MINERALS TAB 1 TABLET: TAB at 20:00

## 2019-07-11 RX ADMIN — ASPIRIN 81 MG 324 MG: 81 TABLET ORAL at 11:55

## 2019-07-11 RX ADMIN — ATORVASTATIN CALCIUM 10 MG: 10 TABLET, FILM COATED ORAL at 20:00

## 2019-07-11 RX ADMIN — SODIUM CHLORIDE: 9 INJECTION, SOLUTION INTRAVENOUS at 22:09

## 2019-07-11 RX ADMIN — Medication 4750 UNITS: at 17:08

## 2019-07-11 RX ADMIN — METOPROLOL SUCCINATE 50 MG: 50 TABLET, EXTENDED RELEASE ORAL at 20:00

## 2019-07-11 RX ADMIN — HEPARIN SODIUM 950 UNITS/HR: 10000 INJECTION, SOLUTION INTRAVENOUS at 17:12

## 2019-07-11 RX ADMIN — OMEPRAZOLE 20 MG: 20 CAPSULE, DELAYED RELEASE ORAL at 19:59

## 2019-07-11 ASSESSMENT — ACTIVITIES OF DAILY LIVING (ADL): ADLS_ACUITY_SCORE: 17

## 2019-07-11 ASSESSMENT — ENCOUNTER SYMPTOMS
CHEST TIGHTNESS: 1
LIGHT-HEADEDNESS: 0
SHORTNESS OF BREATH: 1

## 2019-07-11 NOTE — ED PROVIDER NOTES
"  History     Chief Complaint:  Chest Pain    HPI   Yoseph Cook is a 53 year old male with a history of atrial fibrillation and anxiety who presents with his wife to the ED for the evaluation of chest pain. The patient reports that at 1010 he experienced an approximately 15 minute long episode of chest pain and shortness of breath, prompting him to the ED. The patient notes that he was doing interval training when the episode of chest pain started and that he was \"pushing it\" more than usual. He states that he has been working out every day for the past few weeks. He describes that his chest pain started in the center of his chest as a dull ache and then turned into a more chest heaviness. He states that he then stopped working out and walked around for a few minutes until his chest pain started to subside. He also states that he feels his normal self currently. The patient denies lightheadedness and other issues.  Patient does report that he has had some intermittent left hand tingling that he has wondered if could be cardiac in nature.  This does not only happen when he is working out, sometimes it occurs at rest as well.    Allergies:  No known drug allergies      Medications:    Aspirin 81 mg  Asacol  Tessalon  Lopressor  Omeprazole     Past Medical History:    Adjustment disorder  Anxiety  Arthropathy  Atrial fibrillation  Cervicalgia  Colitis  Diverticulitis    Past Surgical History:    History reviewed. No pertinent surgical history.     Family History:    Hypertension  CAD  Parkinson's disease  Lipids   GI disease  Depression  Hemochromatosis  Osteoarthrosis    Social History:  Smoking status: Former Smoker  Alcohol use: Yes   Marital Status:   [2]     Review of Systems   Respiratory: Positive for chest tightness and shortness of breath.    Cardiovascular: Positive for chest pain.   Neurological: Negative for light-headedness.   All other systems reviewed and are negative.      Physical Exam "     Patient Vitals for the past 24 hrs:   BP Temp Temp src Pulse Heart Rate Resp SpO2 Weight   07/11/19 1515 117/65 -- -- 65 58 18 94 % --   07/11/19 1500 128/65 -- -- 70 70 17 95 % --   07/11/19 1445 127/76 -- -- 68 67 18 95 % --   07/11/19 1430 125/72 -- -- 64 69 19 96 % --   07/11/19 1415 118/71 -- -- 64 71 19 94 % --   07/11/19 1400 (!) 133/91 -- -- 66 66 10 95 % --   07/11/19 1345 (!) 123/92 -- -- 70 78 17 96 % --   07/11/19 1200 -- -- -- -- 73 9 -- --   07/11/19 1145 (!) 128/94 -- -- 74 75 10 95 % --   07/11/19 1130 124/86 -- -- 75 74 11 96 % --   07/11/19 1115 122/82 -- -- 77 78 22 94 % --   07/11/19 1100 137/90 -- -- 81 86 -- 94 % --   07/11/19 1043 (!) 134/91 98.3  F (36.8  C) Oral -- 94 -- 95 % 91.5 kg (201 lb 11.5 oz)        Physical Exam  Nursing note and vitals reviewed.  Constitutional: Cooperative.   HENT:   Mouth/Throat: Moist mucous membranes.   Eyes: EOMI, nonicteric sclera  Cardiovascular: Normal rate, regular rhythm, no murmurs, rubs, or gallops  Pulmonary/Chest: Effort normal and breath sounds normal. No respiratory distress. No wheezes. No rales.   Abdominal: Soft. Nontender, nondistended, no guarding or rigidity. BS present.   Musculoskeletal: Normal range of motion.   Neurological: Alert. Moves all extremities spontaneously.   Skin: Skin is warm and dry. No rash noted.   Psychiatric: Normal mood and affect.       Emergency Department Course   ECG (10:49:59):  Rate 92 bpm. RI interval 164. QRS duration 94. QT/QTc 358/442. P-R-T axes 41 9 26. Normal sinus rhythm. Change in lead III, otherwise unchanged when compared to 1/2/18. Normal ECG.  Interpreted at 1059 by Amandeep Kennedy MD.     Imaging:  Radiographic findings were communicated with the patient who voiced understanding of the findings.  Chest XR, PA and LAT  IMPRESSION: No active cardiopulmonary disease or change since the  prior exam.  As read by Radiology.    Laboratory:  CBC: WNL (WBC 4.0, HGB 15.0, )  CMP: Calcium 8.4  (L), WNL (Creatinine 1.00)  Troponin # 1 (1103): <0.015   Troponin # 2 (1550): 0.052 (H)    Interventions:   1155, aspirin, 324 mg, PO  1708, heparin Loading Dose bolus, 4,750 Units, IV  1712, heparin infusion, 950Units/hr, IV    Emergency Department Course:  Past medical records, nursing notes, and vitals reviewed.  1119: I performed an exam of the patient and obtained history, as documented above.     IV inserted and blood drawn.     The patient was sent for a chest x ray while in the emergency department, findings above.    1345: I rechecked the patient. Explained findings to patient and wife    1643: I rechecked the patient. Explained findings to patient and wife.      Discussed the patient with Dr. Friedman, who will admit the patient to a cardiac tele bed for further monitoring, evaluation, and treatment.  Findings and plan explained to the Patient and spouse who consents to admission.     Impression & Plan    Medical Decision Making:  Pt presents with CC chest pain. The DDx of the patients chest pain includes ACS, angina, pericarditis, PE, pneumonia, pleuritis, dissection, aneurysmal disease, GERD, esophageal spasm, costochronditis/chest wall pain, etc as the most likely etiologies.  Patient's symptoms are inconsistent with PE given he is asymptomatic at rest.  Would not perform further testing for this.  Chest x-ray is negative for pneumothorax or infiltrate.  Initial troponin and EKG are negative.  Explained to patient that we would like to get a 6-hour delta troponin and he was in agreement.  At the 6-hour antoinette after his pain started, troponin was obtained and is mildly elevated.  Given his symptoms earlier in the day, this most likely does represent NSTEMI.  Discussed with patient, recommended admission and heparin drip and he is in agreement.  Dr. Friedman from the hospitalist service accepts to a cardiac telemetry bed.  All questions answered.      Diagnosis:    ICD-10-CM    1. NSTEMI (non-ST elevated  myocardial infarction) (H) I21.4        Disposition:  Patient admitted to a cardiac tele bed.     Scribe Disclosure:  I, Ricardo Pinedo, am serving as a scribe at 10:52 AM on 7/11/2019 to document services personally performed by Amandeep Kennedy MD based on my observations and the provider's statements to me.      Ricardo Pinedo  7/11/2019   [facility] EMERGENCY DEPARTMENT       Amandeep Kennedy MD  07/11/19 1402

## 2019-07-11 NOTE — H&P
[facility]    History and Physical - Hospitalist Service       Date of Admission:  7/11/2019    Assessment & Plan   Yoseph Cook is a 53 year old male admitted on 7/11/2019. He has a past medical history significant for paroxysmal atrial fibrillation, GERD, and sleep apnea.  He presented to the hospital with chest pain.  He does have a very mildly elevated second troponin level.    1.  NSTEMI.  Check serial troponin levels.  Monitor on telemetry.  Continue aspirin, heparin, metoprolol.  Start atorvastatin.  Check lipid panel tomorrow.  Check echocardiogram.  Cardiology consult.    2.  Sleep apnea.  Use CPAP while sleeping.    3.  GERD.  Continue omeprazole.    4.  Atrial fibrillation.  Currently in sinus rhythm.  Continue aspirin and metoprolol.     Diet: Combination Diet Low Saturated Fat Na <2400mg Diet, No Caffeine Diet    DVT Prophylaxis: Heparin   Jerome Catheter: not present  Code Status: Full Code          Miller Friedman, DO  [facility]    ______________________________________________________________________    Chief Complaint   Chest pain.    History is obtained from the patient    History of Present Illness   Yoseph Cook is a 53 year old male who has a past medical history significant for paroxysmal atrial fibrillation, GERD, and sleep apnea.  He was doing a vigorous interval workout earlier today.  Developed chest pain in the center of his chest during his interval workout.  Pain is described as a dull achy pressure sensation just behind his sternum.  Does not radiate anywhere.  Pain lasted approximately 10 minutes.  Pain relieved with rest.  He has not had pain like this previously.  He does note that he occasionally has a numb sensation in his left hand when he exercises.  Did not feel short of breath.  No cough.  No fevers or chills.  No other complaints.    Review of Systems    The 10 point Review of Systems is negative other than noted in the HPI     Past  Medical History    I have reviewed this patient's medical history and updated it with pertinent information if needed.   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  Past Surgical History:   Procedure Laterality Date     HC COLONOSCOPY THRU STOMA, DIAGNOSTIC  1999    normal, done for lower GI/rectal bleed       Social History   I have reviewed this patient's social history and updated it with pertinent information if needed.  Social History     Tobacco Use     Smoking status: Former Smoker     Smokeless tobacco: Never Used   Substance Use Topics     Alcohol use: Yes     Comment:  pack per month     Drug use: No       Family History   I have reviewed this patient's family history and updated it with pertinent information if needed.   Family History   Problem Relation Age of Onset     Hypertension Father      C.A.D. Father      Neurologic Disorder Father         Parkinsons     Lipids Father      Lipids Brother      Breast Cancer Mother      Eye Disorder Mother         macular degeneration     Diabetes Brother      Gastrointestinal Disease Sister      Thyroid Disease Sister         CA     Psychotic Disorder Sister         mental illness     Depression Mother      Depression Father      Osteoporosis Mother      Osteoporosis Sister        Prior to Admission Medications   Prior to Admission Medications   Prescriptions Last Dose Informant Patient Reported? Taking?   albuterol (PROAIR HFA/PROVENTIL HFA/VENTOLIN HFA) 108 (90 BASE) MCG/ACT Inhaler   No No   Sig: Inhale 2 puffs into the lungs every 6 hours as needed  for shortness of breath / dyspnea or wheezing   aspirin 81 MG EC tablet 7/10/2019 at hs  Yes Yes   Sig: Take 81 mg by mouth daily   metoprolol succinate ER (TOPROL-XL) 50 MG 24 hr tablet 7/10/2019 at hs  Yes Yes   Sig: Take 50 mg by mouth daily   multivitamin w/minerals (THERA-VIT-M) tablet 7/10/2019 at hs  Yes Yes   Sig: Take 1 tablet by mouth daily   omeprazole (PRILOSEC) 20 MG DR capsule 7/10/2019 at hs  Yes Yes   Sig: Take 20 mg by mouth daily      Facility-Administered Medications: None     Allergies   Allergies   Allergen Reactions     No Known Allergies        Physical Exam   Vital Signs: Temp: 96  F (35.6  C) Temp src: Oral BP: 135/88 Pulse: 65 Heart Rate: 60 Resp: 16 SpO2: 97 % O2 Device: None (Room air)    Weight: 201 lbs 4.48 oz    Gen:  NAD, A&Ox3.  Eyes:  PERRL, sclera anicteric.  OP:  MMM, no lesions.  Neck:  Supple.  CV:  Regular, no murmurs.  Lung:  CTA b/l, normal effort.  Ab:  +BS, soft.  Skin:  Warm, dry to touch.  No rash.  Ext:  No pitting edema LE b/l.      Data   Data reviewed today: I reviewed all medications, new labs and imaging results over the last 24 hours. I personally reviewed the EKG tracing showing Sinus rhythm, no obvious acute ischemia.    Recent Labs   Lab 07/11/19  1827 07/11/19  1550 07/11/19  1103   WBC 5.6  --  4.0   HGB 15.1  --  15.0   MCV 95  --  93     --  161   NA  --   --  141   POTASSIUM  --   --  3.6   CHLORIDE  --   --  109   CO2  --   --  24   BUN  --   --  25   CR  --   --  1.00   ANIONGAP  --   --  8   TIFFANY  --   --  8.4*   GLC  --   --  95   ALBUMIN  --   --  3.9   PROTTOTAL  --   --  7.0   BILITOTAL  --   --  0.7   ALKPHOS  --   --  57   ALT  --   --  33   AST  --   --  19   TROPI  --  0.052* <0.015

## 2019-07-11 NOTE — ED TRIAGE NOTES
Pt c/o CP while working out. CP lasted about 8-9 mins, no pain at this time. At the time of onset of CP noticed SOB. ABC intact.

## 2019-07-11 NOTE — ED NOTES
"[facility]  ED Nurse Handoff Report    Yoseph Cook is a 53 year old male   ED Chief complaint: Chest Pain  . ED Diagnosis:   Final diagnoses:   NSTEMI (non-ST elevated myocardial infarction) (H)     Allergies:   Allergies   Allergen Reactions     No Known Allergies        Code Status: Full Code  Activity level - Baseline/Home:  Independent. Activity Level - Current:   Independent. Lift room needed: No. Bariatric: No   Needed: No   Isolation: No. Infection: Not Applicable.     Vital Signs:   Vitals:    07/11/19 1430 07/11/19 1445 07/11/19 1500 07/11/19 1515   BP: 125/72 127/76 128/65 117/65   Pulse: 64 68 70 65   Resp: 19 18 17 18   Temp:       TempSrc:       SpO2: 96% 95% 95% 94%   Weight:           Cardiac Rhythm:  ,      Pain level:    Patient confused: No. Patient Falls Risk: No.   Elimination Status: Has voided   Patient Report - Initial Complaint: Chest Pain. Focused Assessment: HPI   Yoseph Cook is a 53 year old male with a history of atrial fibrillation and anxiety who presents with his wife to the ED for the evaluation of chest pain. The patient reports that at 1010 he experienced an approximately 15 minute long episode of chest pain and shortness of breath, prompting him to the ED. The patient notes that he was doing interval training when the episode of chest pain started and that he was \"pushing it\" more than usual. He states that he has been working out every day for the past few weeks. He describes that his chest pain started in the center of his chest as a dull ache and then turned into a more chest heaviness. He states that he then stopped working out and walked around for a few minutes until his chest pain started to subside. He also states that he feels his normal self currently. The patient denies lightheadedness and other issues.     Tests Performed: labs/cxr. Abnormal Results:   Labs Ordered and Resulted from Time of ED Arrival Up to the Time of Departure from the " ED   COMPREHENSIVE METABOLIC PANEL - Abnormal; Notable for the following components:       Result Value    Calcium 8.4 (*)     All other components within normal limits   TROPONIN I - Abnormal; Notable for the following components:    Troponin I ES 0.052 (*)     All other components within normal limits   CBC WITH PLATELETS DIFFERENTIAL   TROPONIN I   PERIPHERAL IV CATHETER   MEASURE WEIGHT     Delta trop elevated**.   Treatments provided: IV Heparin  Family Comments: Spouse at bedside.   OBS brochure/video discussed/provided to patient:  No  ED Medications:   Medications   aspirin (ASA) chewable tablet 324 mg (324 mg Oral Given 7/11/19 1155)     Drips infusing:  Yes  For the majority of the shift, the patient's behavior Green. Interventions performed were n/a.     Severe Sepsis OR Septic Shock Diagnosis Present: No      ED Nurse Name/Phone Number: Donato Conte,   4:34 PM    RECEIVING UNIT ED HANDOFF REVIEW    Above ED Nurse Handoff Report was reviewed: Yes  Reviewed by: Afia Guevara on July 11, 2019 at 5:54 PM

## 2019-07-11 NOTE — PHARMACY-ADMISSION MEDICATION HISTORY
Admission medication history interview status for this patient is complete. See [facility] admission navigator for allergy information, prior to admission medications and immunization status.     Medication history interview source(s):Patient  Medication history resources (including written lists, pill bottles, clinic record):EPIC    Changes made to PTA medication list:  Added: doses to meds  Deleted: Asacol, Tessalon prn, fish oil  Changed: Metoprolol to XR    Medication reconciliation/reorder completed by provider prior to medication history? No    For patients on insulin therapy: N (Y/N)  Lantus/levemir/NPH/Mix 70/30 dose:   (Y/N) (see Med list for doses)   Sliding scale Novolog Y/N  If Yes, do you have a baseline novolog pre-meal dose:  units with meals  Patients eat three meals a day:   Y/N    How many episodes of hypoglycemia do you have per week: _______  How many missed doses do you have per week: ______  How many times do you check your blood glucose per day: _______  Do you have a Continuous glucose monitor (CGM)   Y/N (remind pt that not approved for hospital use)   Any Barriers to therapy - Be specific :  cost of medications, comfortable with giving injections (if applicable), comfortable and confident with current diabetes regimen: Y/N ______________      Prior to Admission medications    Medication Sig Last Dose Taking? Auth Provider   aspirin 81 MG EC tablet Take 81 mg by mouth daily 7/10/2019 at hs Yes Unknown, Entered By History   metoprolol succinate ER (TOPROL-XL) 50 MG 24 hr tablet Take 50 mg by mouth daily 7/10/2019 at hs Yes Unknown, Entered By History   multivitamin w/minerals (THERA-VIT-M) tablet Take 1 tablet by mouth daily 7/10/2019 at hs Yes Unknown, Entered By History   omeprazole (PRILOSEC) 20 MG DR capsule Take 20 mg by mouth daily 7/10/2019 at hs Yes Unknown, Entered By History   albuterol (PROAIR HFA/PROVENTIL HFA/VENTOLIN HFA) 108 (90 BASE) MCG/ACT Inhaler Inhale 2 puffs into the lungs  every 6 hours as needed for shortness of breath / dyspnea or wheezing   Najma Lewis PA-C

## 2019-07-12 ENCOUNTER — SURGERY (OUTPATIENT)
Age: 54
End: 2019-07-12
Payer: COMMERCIAL

## 2019-07-12 VITALS
OXYGEN SATURATION: 97 % | HEART RATE: 72 BPM | WEIGHT: 201.28 LBS | SYSTOLIC BLOOD PRESSURE: 110 MMHG | RESPIRATION RATE: 16 BRPM | TEMPERATURE: 96 F | HEIGHT: 69 IN | BODY MASS INDEX: 29.81 KG/M2 | DIASTOLIC BLOOD PRESSURE: 77 MMHG

## 2019-07-12 LAB
ANION GAP SERPL CALCULATED.3IONS-SCNC: 4 MMOL/L (ref 3–14)
BUN SERPL-MCNC: 20 MG/DL (ref 7–30)
CALCIUM SERPL-MCNC: 7.7 MG/DL (ref 8.5–10.1)
CATH EF ESTIMATED: 60 %
CHLORIDE SERPL-SCNC: 111 MMOL/L (ref 94–109)
CHOLEST SERPL-MCNC: 144 MG/DL
CO2 SERPL-SCNC: 28 MMOL/L (ref 20–32)
CREAT SERPL-MCNC: 0.98 MG/DL (ref 0.66–1.25)
ERYTHROCYTE [DISTWIDTH] IN BLOOD BY AUTOMATED COUNT: 11.8 % (ref 10–15)
GFR SERPL CREATININE-BSD FRML MDRD: 88 ML/MIN/{1.73_M2}
GLUCOSE SERPL-MCNC: 98 MG/DL (ref 70–99)
HCT VFR BLD AUTO: 43 % (ref 40–53)
HDLC SERPL-MCNC: 52 MG/DL
HGB BLD-MCNC: 14.8 G/DL (ref 13.3–17.7)
KCT BLD-ACNC: 134 SEC (ref 75–150)
LDLC SERPL CALC-MCNC: 70 MG/DL
LMWH PPP CHRO-ACNC: 0.19 IU/ML
MCH RBC QN AUTO: 33.2 PG (ref 26.5–33)
MCHC RBC AUTO-ENTMCNC: 34.4 G/DL (ref 31.5–36.5)
MCV RBC AUTO: 96 FL (ref 78–100)
NONHDLC SERPL-MCNC: 92 MG/DL
PLATELET # BLD AUTO: 148 10E9/L (ref 150–450)
POTASSIUM SERPL-SCNC: 3.8 MMOL/L (ref 3.4–5.3)
RBC # BLD AUTO: 4.46 10E12/L (ref 4.4–5.9)
SODIUM SERPL-SCNC: 143 MMOL/L (ref 133–144)
TRIGL SERPL-MCNC: 108 MG/DL
WBC # BLD AUTO: 5 10E9/L (ref 4–11)

## 2019-07-12 PROCEDURE — 25000132 ZZH RX MED GY IP 250 OP 250 PS 637: Performed by: INTERNAL MEDICINE

## 2019-07-12 PROCEDURE — 25000128 H RX IP 250 OP 636: Performed by: INTERNAL MEDICINE

## 2019-07-12 PROCEDURE — 85520 HEPARIN ASSAY: CPT | Performed by: INTERNAL MEDICINE

## 2019-07-12 PROCEDURE — B2111ZZ FLUOROSCOPY OF MULTIPLE CORONARY ARTERIES USING LOW OSMOLAR CONTRAST: ICD-10-PCS | Performed by: INTERNAL MEDICINE

## 2019-07-12 PROCEDURE — 25800030 ZZH RX IP 258 OP 636: Performed by: INTERNAL MEDICINE

## 2019-07-12 PROCEDURE — 99152 MOD SED SAME PHYS/QHP 5/>YRS: CPT | Performed by: INTERNAL MEDICINE

## 2019-07-12 PROCEDURE — 27210794 ZZH OR GENERAL SUPPLY STERILE: Performed by: INTERNAL MEDICINE

## 2019-07-12 PROCEDURE — 25000125 ZZHC RX 250: Performed by: PHYSICIAN ASSISTANT

## 2019-07-12 PROCEDURE — 80048 BASIC METABOLIC PNL TOTAL CA: CPT | Performed by: INTERNAL MEDICINE

## 2019-07-12 PROCEDURE — 25000132 ZZH RX MED GY IP 250 OP 250 PS 637: Performed by: PHYSICIAN ASSISTANT

## 2019-07-12 PROCEDURE — B2151ZZ FLUOROSCOPY OF LEFT HEART USING LOW OSMOLAR CONTRAST: ICD-10-PCS | Performed by: INTERNAL MEDICINE

## 2019-07-12 PROCEDURE — 36415 COLL VENOUS BLD VENIPUNCTURE: CPT | Performed by: INTERNAL MEDICINE

## 2019-07-12 PROCEDURE — 93458 L HRT ARTERY/VENTRICLE ANGIO: CPT | Performed by: INTERNAL MEDICINE

## 2019-07-12 PROCEDURE — 93458 L HRT ARTERY/VENTRICLE ANGIO: CPT | Mod: 26 | Performed by: INTERNAL MEDICINE

## 2019-07-12 PROCEDURE — 80061 LIPID PANEL: CPT | Performed by: INTERNAL MEDICINE

## 2019-07-12 PROCEDURE — 99239 HOSP IP/OBS DSCHRG MGMT >30: CPT | Performed by: INTERNAL MEDICINE

## 2019-07-12 PROCEDURE — 4A023N7 MEASUREMENT OF CARDIAC SAMPLING AND PRESSURE, LEFT HEART, PERCUTANEOUS APPROACH: ICD-10-PCS | Performed by: INTERNAL MEDICINE

## 2019-07-12 PROCEDURE — 85347 COAGULATION TIME ACTIVATED: CPT

## 2019-07-12 PROCEDURE — 85027 COMPLETE CBC AUTOMATED: CPT | Performed by: INTERNAL MEDICINE

## 2019-07-12 PROCEDURE — 99223 1ST HOSP IP/OBS HIGH 75: CPT | Mod: 25 | Performed by: INTERNAL MEDICINE

## 2019-07-12 RX ORDER — HEPARIN SODIUM 1000 [USP'U]/ML
INJECTION, SOLUTION INTRAVENOUS; SUBCUTANEOUS
Status: DISCONTINUED
Start: 2019-07-12 | End: 2019-07-12 | Stop reason: HOSPADM

## 2019-07-12 RX ORDER — LIDOCAINE HYDROCHLORIDE 10 MG/ML
INJECTION, SOLUTION EPIDURAL; INFILTRATION; INTRACAUDAL; PERINEURAL
Status: DISCONTINUED
Start: 2019-07-12 | End: 2019-07-12 | Stop reason: HOSPADM

## 2019-07-12 RX ORDER — LORAZEPAM 2 MG/ML
0.5 INJECTION INTRAMUSCULAR
Status: DISCONTINUED | OUTPATIENT
Start: 2019-07-12 | End: 2019-07-12 | Stop reason: HOSPADM

## 2019-07-12 RX ORDER — LIDOCAINE 40 MG/G
CREAM TOPICAL
Status: DISCONTINUED | OUTPATIENT
Start: 2019-07-12 | End: 2019-07-12

## 2019-07-12 RX ORDER — NALOXONE HYDROCHLORIDE 0.4 MG/ML
.1-.4 INJECTION, SOLUTION INTRAMUSCULAR; INTRAVENOUS; SUBCUTANEOUS
Status: DISCONTINUED | OUTPATIENT
Start: 2019-07-12 | End: 2019-07-12

## 2019-07-12 RX ORDER — NALOXONE HYDROCHLORIDE 0.4 MG/ML
.2-.4 INJECTION, SOLUTION INTRAMUSCULAR; INTRAVENOUS; SUBCUTANEOUS
Status: DISCONTINUED | OUTPATIENT
Start: 2019-07-12 | End: 2019-07-12

## 2019-07-12 RX ORDER — NITROGLYCERIN 5 MG/ML
VIAL (ML) INTRAVENOUS
Status: DISCONTINUED
Start: 2019-07-12 | End: 2019-07-12 | Stop reason: HOSPADM

## 2019-07-12 RX ORDER — DOBUTAMINE HYDROCHLORIDE 200 MG/100ML
2-20 INJECTION INTRAVENOUS CONTINUOUS PRN
Status: DISCONTINUED | OUTPATIENT
Start: 2019-07-12 | End: 2019-07-12 | Stop reason: HOSPADM

## 2019-07-12 RX ORDER — HYDROCODONE BITARTRATE AND ACETAMINOPHEN 5; 325 MG/1; MG/1
1-2 TABLET ORAL EVERY 4 HOURS PRN
Status: DISCONTINUED | OUTPATIENT
Start: 2019-07-12 | End: 2019-07-12 | Stop reason: HOSPADM

## 2019-07-12 RX ORDER — FENTANYL CITRATE 50 UG/ML
INJECTION, SOLUTION INTRAMUSCULAR; INTRAVENOUS
Status: DISCONTINUED
Start: 2019-07-12 | End: 2019-07-12 | Stop reason: HOSPADM

## 2019-07-12 RX ORDER — ATROPINE SULFATE 0.1 MG/ML
0.5 INJECTION INTRAVENOUS EVERY 5 MIN PRN
Status: DISCONTINUED | OUTPATIENT
Start: 2019-07-12 | End: 2019-07-12 | Stop reason: HOSPADM

## 2019-07-12 RX ORDER — FLUMAZENIL 0.1 MG/ML
0.2 INJECTION, SOLUTION INTRAVENOUS
Status: DISCONTINUED | OUTPATIENT
Start: 2019-07-12 | End: 2019-07-12 | Stop reason: HOSPADM

## 2019-07-12 RX ORDER — FENTANYL CITRATE 50 UG/ML
INJECTION, SOLUTION INTRAMUSCULAR; INTRAVENOUS
Status: DISCONTINUED | OUTPATIENT
Start: 2019-07-12 | End: 2019-07-12 | Stop reason: HOSPADM

## 2019-07-12 RX ORDER — LORAZEPAM 0.5 MG/1
0.5 TABLET ORAL
Status: DISCONTINUED | OUTPATIENT
Start: 2019-07-12 | End: 2019-07-12 | Stop reason: HOSPADM

## 2019-07-12 RX ORDER — SODIUM CHLORIDE 9 MG/ML
INJECTION, SOLUTION INTRAVENOUS CONTINUOUS
Status: DISCONTINUED | OUTPATIENT
Start: 2019-07-12 | End: 2019-07-12 | Stop reason: HOSPADM

## 2019-07-12 RX ORDER — FENTANYL CITRATE 50 UG/ML
25-50 INJECTION, SOLUTION INTRAMUSCULAR; INTRAVENOUS
Status: DISCONTINUED | OUTPATIENT
Start: 2019-07-12 | End: 2019-07-12 | Stop reason: HOSPADM

## 2019-07-12 RX ORDER — ACETAMINOPHEN 325 MG/1
650 TABLET ORAL EVERY 4 HOURS PRN
Status: DISCONTINUED | OUTPATIENT
Start: 2019-07-12 | End: 2019-07-12 | Stop reason: HOSPADM

## 2019-07-12 RX ORDER — ARGATROBAN 1 MG/ML
350 INJECTION, SOLUTION INTRAVENOUS
Status: DISCONTINUED | OUTPATIENT
Start: 2019-07-12 | End: 2019-07-12 | Stop reason: HOSPADM

## 2019-07-12 RX ORDER — NOREPINEPHRINE BITARTRATE 0.06 MG/ML
.03-.4 INJECTION, SOLUTION INTRAVENOUS CONTINUOUS PRN
Status: DISCONTINUED | OUTPATIENT
Start: 2019-07-12 | End: 2019-07-12 | Stop reason: HOSPADM

## 2019-07-12 RX ORDER — DOPAMINE HYDROCHLORIDE 160 MG/100ML
2-20 INJECTION, SOLUTION INTRAVENOUS CONTINUOUS PRN
Status: DISCONTINUED | OUTPATIENT
Start: 2019-07-12 | End: 2019-07-12 | Stop reason: HOSPADM

## 2019-07-12 RX ORDER — POTASSIUM CHLORIDE 1500 MG/1
20 TABLET, EXTENDED RELEASE ORAL
Status: COMPLETED | OUTPATIENT
Start: 2019-07-12 | End: 2019-07-12

## 2019-07-12 RX ORDER — IOPAMIDOL 755 MG/ML
INJECTION, SOLUTION INTRAVASCULAR
Status: DISCONTINUED | OUTPATIENT
Start: 2019-07-12 | End: 2019-07-12 | Stop reason: HOSPADM

## 2019-07-12 RX ORDER — NITROGLYCERIN 20 MG/100ML
.07-2 INJECTION INTRAVENOUS CONTINUOUS PRN
Status: DISCONTINUED | OUTPATIENT
Start: 2019-07-12 | End: 2019-07-12 | Stop reason: HOSPADM

## 2019-07-12 RX ORDER — ARGATROBAN 1 MG/ML
150 INJECTION, SOLUTION INTRAVENOUS
Status: DISCONTINUED | OUTPATIENT
Start: 2019-07-12 | End: 2019-07-12 | Stop reason: HOSPADM

## 2019-07-12 RX ADMIN — MIDAZOLAM 2 MG: 1 INJECTION INTRAMUSCULAR; INTRAVENOUS at 13:13

## 2019-07-12 RX ADMIN — FENTANYL CITRATE 25 MCG: 50 INJECTION, SOLUTION INTRAMUSCULAR; INTRAVENOUS at 13:20

## 2019-07-12 RX ADMIN — ACETAMINOPHEN 650 MG: 325 TABLET, FILM COATED ORAL at 15:36

## 2019-07-12 RX ADMIN — POTASSIUM CHLORIDE 20 MEQ: 20 TABLET, EXTENDED RELEASE ORAL at 12:14

## 2019-07-12 RX ADMIN — LIDOCAINE HYDROCHLORIDE 3 ML: 10 INJECTION, SOLUTION INFILTRATION; PERINEURAL at 13:18

## 2019-07-12 RX ADMIN — IOPAMIDOL 115 ML: 755 INJECTION, SOLUTION INTRAVENOUS at 13:34

## 2019-07-12 RX ADMIN — FENTANYL CITRATE 25 MCG: 50 INJECTION, SOLUTION INTRAMUSCULAR; INTRAVENOUS at 13:17

## 2019-07-12 RX ADMIN — ASPIRIN 325 MG: 325 TABLET, DELAYED RELEASE ORAL at 12:14

## 2019-07-12 RX ADMIN — METOPROLOL SUCCINATE 50 MG: 50 TABLET, EXTENDED RELEASE ORAL at 09:20

## 2019-07-12 RX ADMIN — LIDOCAINE HYDROCHLORIDE 10 ML: 10 INJECTION, SOLUTION INFILTRATION; PERINEURAL at 13:17

## 2019-07-12 RX ADMIN — MIDAZOLAM 1 MG: 1 INJECTION INTRAMUSCULAR; INTRAVENOUS at 13:15

## 2019-07-12 RX ADMIN — FENTANYL CITRATE 75 MCG: 50 INJECTION, SOLUTION INTRAMUSCULAR; INTRAVENOUS at 13:13

## 2019-07-12 RX ADMIN — SODIUM CHLORIDE: 9 INJECTION, SOLUTION INTRAVENOUS at 07:21

## 2019-07-12 RX ADMIN — OMEPRAZOLE 20 MG: 20 CAPSULE, DELAYED RELEASE ORAL at 09:20

## 2019-07-12 RX ADMIN — HEPARIN SODIUM 1100 UNITS/HR: 10000 INJECTION, SOLUTION INTRAVENOUS at 10:42

## 2019-07-12 ASSESSMENT — ACTIVITIES OF DAILY LIVING (ADL)
ADLS_ACUITY_SCORE: 12

## 2019-07-12 NOTE — PROGRESS NOTES
[facility]    Medicine Progress Note - Hospitalist Service       Date of Admission:  7/11/2019  Assessment & Plan   Yoseph Cook is a 53 year old male admitted on 7/11/2019. He has a past medical history significant for paroxysmal atrial fibrillation, GERD, and sleep apnea.  He presented to the hospital with chest pain.  He did have a very mildly elevated troponin level.     1.  NSTEMI.  Very mildly elevated troponin level on 7/11.  Monitor on telemetry.  Continue aspirin, heparin, metoprolol, and  atorvastatin.  Cardiology consult appreciated.  Tentative plan for coronary angiogram later today.     2.  Sleep apnea.  Use CPAP while sleeping.     3.  GERD.  Continue omeprazole.     4.  Atrial fibrillation.  Currently in sinus rhythm.  Continue aspirin and metoprolol.     Diet: Combination Diet Low Saturated Fat Na <2400mg Diet, No Caffeine Diet  NPO for Medical/Clinical Reasons Except for: Meds    DVT Prophylaxis: Heparin   Jerome Catheter: not present  Code Status: Full Code      Disposition Plan   Expected discharge: Tomorrow, recommended to prior living arrangement   Entered: Miller Friedman DO 07/12/2019, 12:19 PM       Miller Friedman DO  Hospitalist Service  [facility]    ______________________________________________________________________    Interval History   No further chest pain or shortness of breath.  Denies nausea, vomiting, fevers, chills, or diarrhea.    Data reviewed today: I reviewed all medications, new labs and imaging results over the last 24 hours.     Physical Exam   Vital Signs: Temp: 96  F (35.6  C) Temp src: Oral BP: 119/76 Pulse: 72 Heart Rate: 56 Resp: 18 SpO2: 95 % O2 Device: None (Room air)    Weight: 201 lbs 4.48 oz  Gen:  NAD, A&Ox3.  Eyes:  PERRL, sclera anicteric.  OP:  MMM, no lesions.  Neck:  Supple.  CV:  Regular, no murmurs.  Lung:  CTA b/l, normal effort.  Ab:  +BS, soft.  Skin:  Warm, dry to touch.  No rash.  Ext:  No pitting edema LE  b/l.      Data   Recent Labs   Lab 07/12/19  0557 07/11/19  1827 07/11/19  1550 07/11/19  1103   WBC 5.0 5.6  --  4.0   HGB 14.8 15.1  --  15.0   MCV 96 95  --  93   * 170  --  161     --   --  141   POTASSIUM 3.8  --   --  3.6   CHLORIDE 111*  --   --  109   CO2 28  --   --  24   BUN 20  --   --  25   CR 0.98  --   --  1.00   ANIONGAP 4  --   --  8   TIFFANY 7.7*  --   --  8.4*   GLC 98  --   --  95   ALBUMIN  --   --   --  3.9   PROTTOTAL  --   --   --  7.0   BILITOTAL  --   --   --  0.7   ALKPHOS  --   --   --  57   ALT  --   --   --  33   AST  --   --   --  19   TROPI  --  0.030 0.052* <0.015

## 2019-07-12 NOTE — PROGRESS NOTES
Ambulatory with steady gait.  No new distress and no further questions or concerns.  Expressed understanding of discharge information and medications.  Patient has follow up paperwork for Dr. Mcdonnell   Pt is in stable condition, vss, no c/o pain/cp/sob. Pt dc home today. All dc education, including angio pamphlet with discharge instruction, reviewed w/pt in regards to: diet, activity safety, s/s to report, medications and rx, follow-up appointments/care, etc. Questions were answered and pt verbalized understanding. All pt belongings were sent with pt, dc via wc by staff to main entrance

## 2019-07-12 NOTE — PLAN OF CARE
Orientation: A/O x4  VS: VSS, afebrile  Tele: SR  LS: Clear  GI: NPO for angio, Clears to ADAT after angio  : voiding w/o difficulty  Skin: WDL  Activity: Independent in room, after procedure bedrest until 1545, SBA after  Pain: denies pain  Lines: PIV in L hand   Plan: Continue Hep gtt, monitor angio site, ADAT, discharge when appropriate. Cardiology following  Other: angio done today at 1230, no intervention, groin approach, returned at 1400  Gloria Carl RN on 7/12/2019 at 2:29 PM

## 2019-07-12 NOTE — PROGRESS NOTES
54 yo male with angina with exercise, trop 0.05  Angiogram shows trivial CAD, no intervention  Recommend:  1. Less aggressive physical training program when starting new exercise program- discussed recommend HR levels and duration for goals  2. Would not continue statin- LDL 70 baseline  3. No HTN or arrhythmia- would not continue BB  4. He was already taking ASA 81mg daily, recommend to continue  5. Lifting restriction <10lbs for next 5 days to allow femoral artery to heal, then no physical activity restrictions.  6. May discharge home after post cath protocol, ambulate and urinate without dfficulty before discharge

## 2019-07-12 NOTE — DISCHARGE SUMMARY
[facility]  Hospitalist Discharge Summary       Date of Admission:  7/11/2019  Date of Discharge:  7/12/2019  Discharging Provider: Miller Friedman DO      Discharge Diagnoses   1.  Chest pain.  Initial concern for acute coronary syndrome/NSTEMI.  Did have a very mild troponin elevation.  Was initially started on continuous IV heparin.  Also started on atorvastatin initially.  Continued on prior to admission medications of aspirin and metoprolol.  He did have serial troponins ordered.  Was monitored on telemetry.  Was seen in consultation by cardiology.  He did have coronary angiogram performed on 7/12/2019.  Angiogram shows trivial coronary artery disease.  It is now felt that her chest pain and very mild troponin elevation were due to overly vigorous exercise.    2.  Sleep apnea.  Continue to use CPAP while sleeping.    3.  GERD.  Continue omeprazole.    4.  Paroxysmal atrial fibrillation.  Continue prior to admission medications of aspirin and metoprolol.    Follow-ups Needed After Discharge   Follow-up Appointments     Follow-up and recommended labs and tests       Follow up with primary care provider, Rashard Pizarro, within 3-4 weeks   for hospital follow- up.             Discharge Disposition   Discharged to home  Condition at discharge: Stable        Consultations This Hospital Stay   PHARMACY TO DOSE HEPARIN  CARDIOLOGY IP CONSULT  PHARMACY TO DOSE HEPARIN  PHARMACY IP CONSULT  PHARMACY IP CONSULT  SMOKING CESSATION PROGRAM IP CONSULT    Code Status   Full Code    Time Spent on this Encounter   I spent 35 minutes with Mr. Cook and working on discharge on 7/12/2019.       Miller Friedman DO  [facility]  ______________________________________________________________________    Physical Exam   Vital Signs: Temp: 96.7  F (35.9  C) Temp src: Oral BP: 129/72 Pulse: 72 Heart Rate: 62 Resp: 16 SpO2: 96 % O2 Device: None (Room air) Oxygen Delivery: 3 LPM  Weight: 201 lbs  4.48 oz  Gen:  NAD, A&Ox3.  Eyes:  PERRL, sclera anicteric.  OP:  MMM, no lesions.  Neck:  Supple.  CV:  Regular, no murmurs.  Lung:  CTA b/l, normal effort.  Ab:  +BS, soft.  Skin:  Warm, dry to touch.  No rash.  Ext:  No pitting edema LE b/l.         Primary Care Physician   Rashard Pizarro    Discharge Orders      Reason for your hospital stay    Chest pain     Follow-up and recommended labs and tests     Follow up with primary care provider, Rashard Pizarro, within 3-4 weeks for hospital follow- up.     Activity    Your activity upon discharge: activity as tolerated     Diet    Follow this diet upon discharge: Regular           Discharge Medications   Current Discharge Medication List      CONTINUE these medications which have NOT CHANGED    Details   aspirin 81 MG EC tablet Take 81 mg by mouth daily      metoprolol succinate ER (TOPROL-XL) 50 MG 24 hr tablet Take 50 mg by mouth daily      multivitamin w/minerals (THERA-VIT-M) tablet Take 1 tablet by mouth daily      omeprazole (PRILOSEC) 20 MG DR capsule Take 20 mg by mouth daily      albuterol (PROAIR HFA/PROVENTIL HFA/VENTOLIN HFA) 108 (90 BASE) MCG/ACT Inhaler Inhale 2 puffs into the lungs every 6 hours as needed for shortness of breath / dyspnea or wheezing  Qty: 1 Inhaler, Refills: 0    Associated Diagnoses: Cough           Allergies   Allergies   Allergen Reactions     No Known Allergies

## 2019-07-12 NOTE — CONSULTS
Cardiology Consultation      Yoseph Cook MRN# [id number]   YOB: 1965 Age: 53 year old   Date of Admission: 7/11/2019     Reason for consult: Elevated troponin           Assessment and Plan:     1. Elevated troponin, NSTEMI    No HTN, dyslipidemia, smoking, or DM.  Father had CABG in 70s    Now with exertional chest pain (recently started intense workouts 3 days ago), and has had exertional left hand/arm tingling/paresthesias over the past year    Trops <0.015, 0.052, 0.030    EKG SR without acute ischemic ST/T changes    Echo is ordered    He is pain fee    Continue heparin, ASA, BB, low dose statin started with LDL 70.  We may intensify pending cath results    Cardiac cath possible intervention today    R and B discussed with patient and family by Dr. Hardwick    He is NPO    No apparent CI to ELOINA.  He does have ulcerative colitis, but has been in remission.      Further recs pending results      2. PAF    Diagnosed in 2015 at [facility].  At that time, he had chest pain with the afib but also palpitations    No recurrence     EKG here shows SR    Has been on Toprol XL 50 mg for rate control    [id number] score has been 0.  He has been on ASA 81 mg      3. ARCHIE    CPAP      4. Hemochromatosis     Phlebotomy every other month       5. Ulcerative colitis, in resmission               Chief Complaint:   Chest Pain           History of Present Illness:   This patient is a 53 year old male  with history of PAF (diagnosed about 5 years ago), sleep apnea on CPAP, GERD, anxiety, hemochromatosis with every other month phlebotomy, ulcerative colitis in remission.  He presented to the ER with chest pain.    He has no history of HTN, dyslipidemia, DM, smoking.  His father had CABG in his 70s.     Recently started working out 3 days ago and has been intensively training.    Onset of chest pain 7/11/19 about 1010 with training. This was in his central chest and is described as a dull ache that turned into a  heaviness.   He stopped working out.   Episode lasted about 15 minutes.   Chest discomfort resolved.       In 2015 when he was diagnosed with afib, he had chest pain but also palpitations.   Currently he has chest pain. No palpitations as he had then.   Noted is that he has been having exertional left hand and arm tingling/paresthesias over the past year.     Troponin initially <0.015 then 0.052. Third trop is 0.030.  EKG showed SR with VR 92, no acute ischemic ST/T changes.   CXR negative per radiology read.     He was given  mg, and started on heparin.   Echo is ordered.     Currently his family is at the bedside, he is chest pain free now.           Prior cardiac testing  Exercise stress test 12/1/15:  IMPRESSION   1. Exercise duration and workload were adequate. The patient exercised 11:45 minutes on a Byron protocol.   2. The peak heart rate was 173 bpm (102% MPHR); peak blood pressure was 192 mmHg/- mmHg.   3. There were no significant symptoms with stress.   4. The stress ECG was normal.   5. Myocardial perfusion was normal.   6. Left ventricular cavity size was normal (EDV 90 ml).   7. Overall left ventricular systolic function was normal without wall motion abnormalities. The post stress LVEF was visually estimated to be 65%.   8. There were no prior studies available for comparison.      Echo 11/27/15:  Final Impressions:   1. Normal LV size, mildly increased wall thickness, normal global systolic function with an estimated EF of 60 - 65%. No regional wall motion abnormalities appreciated.   2. Right ventricular cavity size is normal, global systolic RV function is normal.   3. No hemodynamically significant valvular disease noted.   4. Inadequate TR Doppler envelope for estimation of RVSP.   5. IVC size and respiratory variation compatible with a normal estimated RA pressure of 0-5 mmHg.   6. No pericardial effusion, masses, or shunts identified.        Also exercise stress testing and echo in 2007  done for chest pain, both were normal.           Physical Exam:   Vitals were reviewed  Blood pressure 119/76, pulse 72, temperature 96  F (35.6  C), temperature source Oral, resp. rate 18, weight 91.3 kg (201 lb 4.5 oz), SpO2 95 %.  Temperatures:  Current - Temp: 96  F (35.6  C); Max - Temp  Av.7  F (35.9  C)  Min: 96  F (35.6  C)  Max: 98.1  F (36.7  C)  Respiration range: Resp  Avg: 15  Min: 9  Max: 22  Pulse range: Pulse  Av.2  Min: 64  Max: 82  Blood pressure range: Systolic (24hrs), Av , Min:102 , Max:137   ; Diastolic (24hrs), Av, Min:64, Max:94    Pulse oximetry range: SpO2  Av.9 %  Min: 93 %  Max: 97 %    Intake/Output Summary (Last 24 hours) at 2019 1049  Last data filed at 2019 0605  Gross per 24 hour   Intake 886 ml   Output --   Net 886 ml     Constitutional:   awake, alert, cooperative, no apparent distress, and appears stated age     Eyes:   no icterus     Neck:   supple, symmetrical, trachea midline,      Back:   symmetric     Lungs:   CTAB     Cardiovascular:   RRR, S1S2, no sig murmur, no JVD, no edema     Abdomen:   Soft, non-tender, BS+     Musculoskeletal:   Moving all extremities     Neurologic:   Grossly nonfocal     Skin:   normal skin color, texture, turgor     Additional findings:                  Past Medical History:   I have reviewed this patient's past medical history  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     Atrial fibrillation (H)      Cervicalgia 2003    C5 disc herniation     Colitis      Diverticulitis              Past Surgical History:   I have reviewed this patient's past surgical history  Past Surgical History:   Procedure Laterality Date     HC COLONOSCOPY THRU STOMA, DIAGNOSTIC      normal, done for lower GI/rectal bleed               Social History:   I  have reviewed this patient's social history  Social History     Tobacco Use     Smoking status: Former Smoker     Smokeless tobacco: Never Used   Substance Use Topics     Alcohol use: Yes     Comment:  pack per month             Family History:   I have reviewed this patient's family history  Family History   Problem Relation Age of Onset     Hypertension Father      C.A.D. Father      Neurologic Disorder Father         Parkinsons     Lipids Father      Lipids Brother      Breast Cancer Mother      Eye Disorder Mother         macular degeneration     Diabetes Brother      Gastrointestinal Disease Sister      Thyroid Disease Sister         CA     Psychotic Disorder Sister         mental illness     Depression Mother      Depression Father      Osteoporosis Mother      Osteoporosis Sister              Allergies:     Allergies   Allergen Reactions     No Known Allergies              Medications:   I have reviewed this patient's current medications  Medications Prior to Admission   Medication Sig Dispense Refill Last Dose     aspirin 81 MG EC tablet Take 81 mg by mouth daily   7/10/2019 at hs     metoprolol succinate ER (TOPROL-XL) 50 MG 24 hr tablet Take 50 mg by mouth daily   7/10/2019 at hs     multivitamin w/minerals (THERA-VIT-M) tablet Take 1 tablet by mouth daily   7/10/2019 at hs     omeprazole (PRILOSEC) 20 MG DR capsule Take 20 mg by mouth daily   7/10/2019 at hs     albuterol (PROAIR HFA/PROVENTIL HFA/VENTOLIN HFA) 108 (90 BASE) MCG/ACT Inhaler Inhale 2 puffs into the lungs every 6 hours as needed for shortness of breath / dyspnea or wheezing 1 Inhaler 0      Current Facility-Administered Medications Ordered in Epic   Medication Dose Route Frequency Last Rate Last Dose     acetaminophen (TYLENOL) tablet 650 mg  650 mg Oral Q4H PRN         aspirin EC tablet 81 mg  81 mg Oral Daily         atorvastatin (LIPITOR) tablet 10 mg  10 mg Oral QPM   10 mg at 07/11/19 2000     bisacodyl (DULCOLAX) Suppository 10 mg  10  mg Rectal Daily PRN         heparin infusion 25,000 units in 0.45% NaCl 250 mL  1,100 Units/hr Intravenous Continuous 11 mL/hr at 07/12/19 1042 1,100 Units/hr at 07/12/19 1042     HYDROmorphone (PF) (DILAUDID) injection 0.2 mg  0.2 mg Intravenous Q2H PRN         lidocaine (LMX4) cream   Topical Q1H PRN         lidocaine 1 % 0.1-1 mL  0.1-1 mL Other Q1H PRN         melatonin tablet 1 mg  1 mg Oral At Bedtime PRN         metoprolol succinate ER (TOPROL-XL) 24 hr tablet 50 mg  50 mg Oral Daily   50 mg at 07/12/19 0920     multivitamin w/minerals (THERA-VIT-M) tablet 1 tablet  1 tablet Oral Daily   1 tablet at 07/11/19 2000     naloxone (NARCAN) injection 0.1-0.4 mg  0.1-0.4 mg Intravenous Q2 Min PRN         nitroGLYcerin (NITROSTAT) sublingual tablet 0.4 mg  0.4 mg Sublingual Q5 Min PRN         omeprazole (priLOSEC) CR capsule 20 mg  20 mg Oral Daily   20 mg at 07/12/19 0920     oxyCODONE (ROXICODONE) tablet 5 mg  5 mg Oral Q4H PRN         Patient is already receiving anticoagulation with heparin, enoxaparin (LOVENOX), warfarin (COUMADIN)  or other anticoagulant medication   Does not apply Continuous PRN         polyethylene glycol (MIRALAX/GLYCOLAX) Packet 17 g  17 g Oral Daily PRN         prochlorperazine (COMPAZINE) injection 10 mg  10 mg Intravenous Q6H PRN        Or     prochlorperazine (COMPAZINE) tablet 10 mg  10 mg Oral Q6H PRN        Or     prochlorperazine (COMPAZINE) Suppository 25 mg  25 mg Rectal Q12H PRN         senna-docusate (SENOKOT-S/PERICOLACE) 8.6-50 MG per tablet 1 tablet  1 tablet Oral BID PRN        Or     senna-docusate (SENOKOT-S/PERICOLACE) 8.6-50 MG per tablet 2 tablet  2 tablet Oral BID PRN         sodium chloride (PF) 0.9% PF flush 3 mL  3 mL Intracatheter q1 min prn         sodium chloride (PF) 0.9% PF flush 3 mL  3 mL Intracatheter Q8H         sodium chloride 0.9% infusion   Intravenous Continuous 100 mL/hr at 07/12/19 0721       No current [facility]-ordered outpatient medications on file.              Review of Systems:   The 10 point Review of Systems is negative other than noted in the HPI            Data:   All laboratory data reviewed  Results for orders placed or performed during the hospital encounter of 07/11/19 (from the past 24 hour(s))   EKG 12 lead   Result Value Ref Range    Interpretation ECG Click View Image link to view waveform and result    CBC with platelets differential   Result Value Ref Range    WBC 4.0 4.0 - 11.0 10e9/L    RBC Count 4.54 4.4 - 5.9 10e12/L    Hemoglobin 15.0 13.3 - 17.7 g/dL    Hematocrit 42.3 40.0 - 53.0 %    MCV 93 78 - 100 fl    MCH 33.0 26.5 - 33.0 pg    MCHC 35.5 31.5 - 36.5 g/dL    RDW 11.7 10.0 - 15.0 %    Platelet Count 161 150 - 450 10e9/L    Diff Method Automated Method     % Neutrophils 58.9 %    % Lymphocytes 27.2 %    % Monocytes 11.7 %    % Eosinophils 1.5 %    % Basophils 0.5 %    % Immature Granulocytes 0.2 %    Nucleated RBCs 0 0 /100    Absolute Neutrophil 2.4 1.6 - 8.3 10e9/L    Absolute Lymphocytes 1.1 0.8 - 5.3 10e9/L    Absolute Monocytes 0.5 0.0 - 1.3 10e9/L    Absolute Eosinophils 0.1 0.0 - 0.7 10e9/L    Absolute Basophils 0.0 0.0 - 0.2 10e9/L    Abs Immature Granulocytes 0.0 0 - 0.4 10e9/L    Absolute Nucleated RBC 0.0    Comprehensive metabolic panel   Result Value Ref Range    Sodium 141 133 - 144 mmol/L    Potassium 3.6 3.4 - 5.3 mmol/L    Chloride 109 94 - 109 mmol/L    Carbon Dioxide 24 20 - 32 mmol/L    Anion Gap 8 3 - 14 mmol/L    Glucose 95 70 - 99 mg/dL    Urea Nitrogen 25 7 - 30 mg/dL    Creatinine 1.00 0.66 - 1.25 mg/dL    GFR Estimate 85 >60 mL/min/[1.73_m2]    GFR Estimate If Black >90 >60 mL/min/[1.73_m2]    Calcium 8.4 (L) 8.5 - 10.1 mg/dL    Bilirubin Total 0.7 0.2 - 1.3 mg/dL    Albumin 3.9 3.4 - 5.0 g/dL    Protein Total 7.0 6.8 - 8.8 g/dL    Alkaline Phosphatase 57 40 - 150 U/L    ALT 33 0 - 70 U/L    AST 19 0 - 45 U/L   Troponin I   Result Value Ref Range    Troponin I ES <0.015 0.000 - 0.045 ug/L   Chest XR,  PA & LAT     Narrative    CHEST TWO VIEWS 7/11/2019 12:03 PM     HISTORY: Chest pain.    COMPARISON: 2/19/2018.      Impression    IMPRESSION: No active cardiopulmonary disease or change since the  prior exam.    ELIAZAR THOMAS MD   Troponin I   Result Value Ref Range    Troponin I ES 0.052 (H) 0.000 - 0.045 ug/L   CBC with platelets   Result Value Ref Range    WBC 5.6 4.0 - 11.0 10e9/L    RBC Count 4.51 4.4 - 5.9 10e12/L    Hemoglobin 15.1 13.3 - 17.7 g/dL    Hematocrit 42.9 40.0 - 53.0 %    MCV 95 78 - 100 fl    MCH 33.5 (H) 26.5 - 33.0 pg    MCHC 35.2 31.5 - 36.5 g/dL    RDW 11.8 10.0 - 15.0 %    Platelet Count 170 150 - 450 10e9/L   Troponin I   Result Value Ref Range    Troponin I ES 0.030 0.000 - 0.045 ug/L   Heparin 10a Level   Result Value Ref Range    Heparin 10A Level 0.14 IU/mL   Basic metabolic panel   Result Value Ref Range    Sodium 143 133 - 144 mmol/L    Potassium 3.8 3.4 - 5.3 mmol/L    Chloride 111 (H) 94 - 109 mmol/L    Carbon Dioxide 28 20 - 32 mmol/L    Anion Gap 4 3 - 14 mmol/L    Glucose 98 70 - 99 mg/dL    Urea Nitrogen 20 7 - 30 mg/dL    Creatinine 0.98 0.66 - 1.25 mg/dL    GFR Estimate 88 >60 mL/min/[1.73_m2]    GFR Estimate If Black >90 >60 mL/min/[1.73_m2]    Calcium 7.7 (L) 8.5 - 10.1 mg/dL   CBC with platelets   Result Value Ref Range    WBC 5.0 4.0 - 11.0 10e9/L    RBC Count 4.46 4.4 - 5.9 10e12/L    Hemoglobin 14.8 13.3 - 17.7 g/dL    Hematocrit 43.0 40.0 - 53.0 %    MCV 96 78 - 100 fl    MCH 33.2 (H) 26.5 - 33.0 pg    MCHC 34.4 31.5 - 36.5 g/dL    RDW 11.8 10.0 - 15.0 %    Platelet Count 148 (L) 150 - 450 10e9/L   Heparin Xa (10a) Level   Result Value Ref Range    Heparin 10A Level 0.19 IU/mL   Lipid panel reflex to direct LDL   Result Value Ref Range    Cholesterol 144 <200 mg/dL    Triglycerides 108 <150 mg/dL    HDL Cholesterol 52 >39 mg/dL    LDL Cholesterol Calculated 70 <100 mg/dL    Non HDL Cholesterol 92 <130 mg/dL       Lab Results   Component Value Date    CHOL 144 07/12/2019      Lab Results   Component Value Date    HDL 52 07/12/2019     Lab Results   Component Value Date    LDL 70 07/12/2019     Lab Results   Component Value Date    TRIG 108 07/12/2019     Lab Results   Component Value Date    CHOLHDLRATIO 3.0 05/26/2006     TSH   Date Value Ref Range Status   07/18/2007 0.48 0.4 - 5.0 mU/L Final

## 2019-07-16 ENCOUNTER — TELEPHONE (OUTPATIENT)
Dept: CARDIOLOGY | Facility: CLINIC | Age: 54
End: 2019-07-16

## 2019-07-16 NOTE — TELEPHONE ENCOUNTER
Patient was evaluated by cardiology while inpatient for NSTEMI (s/p coronary angiogram 7/12/19.. Trivial CAD). Called patient to discuss any post hospital d/c questions he may have, review medication changes, and confirm f/u appts. Patient denied any questions regarding new medications or changes to PTA medications. Patient denied any SOB, chest pain, or light headedness. Patient confirms his groin site looks good without pain, redness, or swelling.RN confirmed with patient that he has an apt scheduled for f/u with his cardiologist at the [facility]. Patient advised to call clinic with any cardiac related questions or concerns prior to this angeline't. Patient verbalized understanding and agreed with plan.             7/12/19 cardiology progress note  52 yo male with angina with exercise, trop 0.05  Angiogram shows trivial CAD, no intervention  Recommend:  1. Less aggressive physical training program when starting new exercise program- discussed recommend HR levels and duration for goals  2. Would not continue statin- LDL 70 baseline  3. No HTN or arrhythmia- would not continue BB  4. He was already taking ASA 81mg daily, recommend to continue  5. Lifting restriction <10lbs for next 5 days to allow femoral artery to heal, then no physical activity restrictions.  6. May discharge home after post cath protocol, ambulate and urinate without dfficulty before discharge

## 2019-09-29 ENCOUNTER — HEALTH MAINTENANCE LETTER (OUTPATIENT)
Age: 54
End: 2019-09-29

## 2019-12-20 ENCOUNTER — HOSPITAL ENCOUNTER (OUTPATIENT)
Facility: CLINIC | Age: 54
Setting detail: OBSERVATION
Discharge: HOME | End: 2019-12-20
Attending: EMERGENCY MEDICINE | Admitting: INTERNAL MEDICINE
Payer: COMMERCIAL

## 2019-12-20 ENCOUNTER — APPOINTMENT (OUTPATIENT)
Dept: CT IMAGING | Facility: CLINIC | Age: 54
End: 2019-12-20
Attending: EMERGENCY MEDICINE
Payer: COMMERCIAL

## 2019-12-20 VITALS
SYSTOLIC BLOOD PRESSURE: 120 MMHG | HEIGHT: 69 IN | DIASTOLIC BLOOD PRESSURE: 70 MMHG | TEMPERATURE: 98 F | OXYGEN SATURATION: 97 % | WEIGHT: 210.3 LBS | BODY MASS INDEX: 31.15 KG/M2 | HEART RATE: 69 BPM | RESPIRATION RATE: 16 BRPM

## 2019-12-20 DIAGNOSIS — R91.8 PULMONARY NODULES: ICD-10-CM

## 2019-12-20 DIAGNOSIS — R07.9 CHEST PAIN, UNSPECIFIED TYPE: ICD-10-CM

## 2019-12-20 DIAGNOSIS — R07.89 CHEST DISCOMFORT: Primary | ICD-10-CM

## 2019-12-20 LAB
ALBUMIN SERPL-MCNC: 4.1 G/DL (ref 3.4–5)
ALP SERPL-CCNC: 71 U/L (ref 40–150)
ALT SERPL W P-5'-P-CCNC: 52 U/L (ref 0–70)
ANION GAP SERPL CALCULATED.3IONS-SCNC: 7 MMOL/L (ref 3–14)
AST SERPL W P-5'-P-CCNC: 34 U/L (ref 0–45)
BASOPHILS # BLD AUTO: 0 10E9/L (ref 0–0.2)
BASOPHILS NFR BLD AUTO: 0.6 %
BILIRUB SERPL-MCNC: 0.5 MG/DL (ref 0.2–1.3)
BUN SERPL-MCNC: 24 MG/DL (ref 7–30)
CALCIUM SERPL-MCNC: 8.5 MG/DL (ref 8.5–10.1)
CHLORIDE SERPL-SCNC: 108 MMOL/L (ref 94–109)
CO2 SERPL-SCNC: 26 MMOL/L (ref 20–32)
CREAT BLD-MCNC: 1.1 MG/DL (ref 0.66–1.25)
CREAT SERPL-MCNC: 0.97 MG/DL (ref 0.66–1.25)
D DIMER PPP FEU-MCNC: 0.7 UG/ML FEU (ref 0–0.5)
DIFFERENTIAL METHOD BLD: ABNORMAL
EOSINOPHIL # BLD AUTO: 0.1 10E9/L (ref 0–0.7)
EOSINOPHIL NFR BLD AUTO: 1.9 %
ERYTHROCYTE [DISTWIDTH] IN BLOOD BY AUTOMATED COUNT: 11.4 % (ref 10–15)
GFR SERPL CREATININE-BSD FRML MDRD: 70 ML/MIN/{1.73_M2}
GFR SERPL CREATININE-BSD FRML MDRD: 88 ML/MIN/{1.73_M2}
GLUCOSE SERPL-MCNC: 113 MG/DL (ref 70–99)
HCT VFR BLD AUTO: 47 % (ref 40–53)
HGB BLD-MCNC: 17.2 G/DL (ref 13.3–17.7)
IMM GRANULOCYTES # BLD: 0 10E9/L (ref 0–0.4)
IMM GRANULOCYTES NFR BLD: 0.2 %
INTERPRETATION ECG - MUSE: NORMAL
LYMPHOCYTES # BLD AUTO: 2 10E9/L (ref 0.8–5.3)
LYMPHOCYTES NFR BLD AUTO: 42.2 %
MCH RBC QN AUTO: 33.7 PG (ref 26.5–33)
MCHC RBC AUTO-ENTMCNC: 36.6 G/DL (ref 31.5–36.5)
MCV RBC AUTO: 92 FL (ref 78–100)
MONOCYTES # BLD AUTO: 0.5 10E9/L (ref 0–1.3)
MONOCYTES NFR BLD AUTO: 11.2 %
NEUTROPHILS # BLD AUTO: 2.1 10E9/L (ref 1.6–8.3)
NEUTROPHILS NFR BLD AUTO: 43.9 %
NRBC # BLD AUTO: 0 10*3/UL
NRBC BLD AUTO-RTO: 0 /100
PLATELET # BLD AUTO: 189 10E9/L (ref 150–450)
POTASSIUM SERPL-SCNC: 3.7 MMOL/L (ref 3.4–5.3)
PROT SERPL-MCNC: 7.8 G/DL (ref 6.8–8.8)
RBC # BLD AUTO: 5.1 10E12/L (ref 4.4–5.9)
SODIUM SERPL-SCNC: 141 MMOL/L (ref 133–144)
TROPONIN I SERPL-MCNC: <0.015 UG/L (ref 0–0.04)
WBC # BLD AUTO: 4.8 10E9/L (ref 4–11)

## 2019-12-20 PROCEDURE — 36415 COLL VENOUS BLD VENIPUNCTURE: CPT | Performed by: PHYSICIAN ASSISTANT

## 2019-12-20 PROCEDURE — 85379 FIBRIN DEGRADATION QUANT: CPT | Performed by: EMERGENCY MEDICINE

## 2019-12-20 PROCEDURE — 25000125 ZZHC RX 250: Performed by: EMERGENCY MEDICINE

## 2019-12-20 PROCEDURE — 71275 CT ANGIOGRAPHY CHEST: CPT

## 2019-12-20 PROCEDURE — 99220 ZZC INITIAL OBSERVATION CARE,LEVL III: CPT | Performed by: PHYSICIAN ASSISTANT

## 2019-12-20 PROCEDURE — 25000128 H RX IP 250 OP 636: Performed by: EMERGENCY MEDICINE

## 2019-12-20 PROCEDURE — 82565 ASSAY OF CREATININE: CPT

## 2019-12-20 PROCEDURE — 93005 ELECTROCARDIOGRAM TRACING: CPT

## 2019-12-20 PROCEDURE — 40000275 ZZH STATISTIC RCP TIME EA 10 MIN

## 2019-12-20 PROCEDURE — 25000132 ZZH RX MED GY IP 250 OP 250 PS 637: Performed by: EMERGENCY MEDICINE

## 2019-12-20 PROCEDURE — 93010 ELECTROCARDIOGRAM REPORT: CPT | Performed by: INTERNAL MEDICINE

## 2019-12-20 PROCEDURE — 99285 EMERGENCY DEPT VISIT HI MDM: CPT | Mod: 25

## 2019-12-20 PROCEDURE — 84484 ASSAY OF TROPONIN QUANT: CPT | Performed by: PHYSICIAN ASSISTANT

## 2019-12-20 PROCEDURE — 85025 COMPLETE CBC W/AUTO DIFF WBC: CPT | Performed by: EMERGENCY MEDICINE

## 2019-12-20 PROCEDURE — 25000132 ZZH RX MED GY IP 250 OP 250 PS 637: Performed by: PHYSICIAN ASSISTANT

## 2019-12-20 PROCEDURE — 84484 ASSAY OF TROPONIN QUANT: CPT | Performed by: EMERGENCY MEDICINE

## 2019-12-20 PROCEDURE — 80053 COMPREHEN METABOLIC PANEL: CPT | Performed by: EMERGENCY MEDICINE

## 2019-12-20 PROCEDURE — G0378 HOSPITAL OBSERVATION PER HR: HCPCS

## 2019-12-20 RX ORDER — AMOXICILLIN 250 MG
1 CAPSULE ORAL 2 TIMES DAILY PRN
Status: DISCONTINUED | OUTPATIENT
Start: 2019-12-20 | End: 2019-12-20 | Stop reason: HOSPADM

## 2019-12-20 RX ORDER — DILTIAZEM HYDROCHLORIDE 120 MG/1
120 CAPSULE, COATED, EXTENDED RELEASE ORAL EVERY MORNING
COMMUNITY

## 2019-12-20 RX ORDER — NALOXONE HYDROCHLORIDE 0.4 MG/ML
.1-.4 INJECTION, SOLUTION INTRAMUSCULAR; INTRAVENOUS; SUBCUTANEOUS
Status: DISCONTINUED | OUTPATIENT
Start: 2019-12-20 | End: 2019-12-20 | Stop reason: HOSPADM

## 2019-12-20 RX ORDER — ONDANSETRON 4 MG/1
4 TABLET, ORALLY DISINTEGRATING ORAL EVERY 6 HOURS PRN
Status: DISCONTINUED | OUTPATIENT
Start: 2019-12-20 | End: 2019-12-20 | Stop reason: HOSPADM

## 2019-12-20 RX ORDER — ATORVASTATIN CALCIUM 10 MG/1
10 TABLET, FILM COATED ORAL EVERY MORNING
COMMUNITY

## 2019-12-20 RX ORDER — DIAZEPAM 2 MG
2 TABLET ORAL EVERY 6 HOURS
Status: DISCONTINUED | OUTPATIENT
Start: 2019-12-20 | End: 2019-12-20 | Stop reason: HOSPADM

## 2019-12-20 RX ORDER — ISOSORBIDE MONONITRATE 30 MG/1
30 TABLET, EXTENDED RELEASE ORAL DAILY
Status: DISCONTINUED | OUTPATIENT
Start: 2019-12-20 | End: 2019-12-20 | Stop reason: HOSPADM

## 2019-12-20 RX ORDER — AMOXICILLIN 250 MG
2 CAPSULE ORAL 2 TIMES DAILY PRN
Status: DISCONTINUED | OUTPATIENT
Start: 2019-12-20 | End: 2019-12-20 | Stop reason: HOSPADM

## 2019-12-20 RX ORDER — IOPAMIDOL 755 MG/ML
500 INJECTION, SOLUTION INTRAVASCULAR ONCE
Status: COMPLETED | OUTPATIENT
Start: 2019-12-20 | End: 2019-12-20

## 2019-12-20 RX ORDER — ASPIRIN 81 MG/1
324 TABLET, CHEWABLE ORAL ONCE
Status: COMPLETED | OUTPATIENT
Start: 2019-12-20 | End: 2019-12-20

## 2019-12-20 RX ORDER — AMLODIPINE BESYLATE 10 MG/1
10 TABLET ORAL DAILY
COMMUNITY
End: 2019-12-20

## 2019-12-20 RX ORDER — ISOSORBIDE MONONITRATE 30 MG/1
30 TABLET, EXTENDED RELEASE ORAL DAILY
Qty: 30 TABLET | Refills: 0 | Status: SHIPPED | OUTPATIENT
Start: 2019-12-21

## 2019-12-20 RX ORDER — POLYETHYLENE GLYCOL 3350 17 G/17G
17 POWDER, FOR SOLUTION ORAL DAILY PRN
Status: DISCONTINUED | OUTPATIENT
Start: 2019-12-20 | End: 2019-12-20 | Stop reason: HOSPADM

## 2019-12-20 RX ORDER — PROCHLORPERAZINE MALEATE 5 MG
10 TABLET ORAL EVERY 6 HOURS PRN
Status: DISCONTINUED | OUTPATIENT
Start: 2019-12-20 | End: 2019-12-20 | Stop reason: HOSPADM

## 2019-12-20 RX ORDER — PROCHLORPERAZINE 25 MG
25 SUPPOSITORY, RECTAL RECTAL EVERY 12 HOURS PRN
Status: DISCONTINUED | OUTPATIENT
Start: 2019-12-20 | End: 2019-12-20 | Stop reason: HOSPADM

## 2019-12-20 RX ORDER — ONDANSETRON 2 MG/ML
4 INJECTION INTRAMUSCULAR; INTRAVENOUS EVERY 6 HOURS PRN
Status: DISCONTINUED | OUTPATIENT
Start: 2019-12-20 | End: 2019-12-20 | Stop reason: HOSPADM

## 2019-12-20 RX ORDER — ACETAMINOPHEN 650 MG/1
650 SUPPOSITORY RECTAL EVERY 4 HOURS PRN
Status: DISCONTINUED | OUTPATIENT
Start: 2019-12-20 | End: 2019-12-20 | Stop reason: HOSPADM

## 2019-12-20 RX ORDER — DIAZEPAM 2 MG
2 TABLET ORAL EVERY 6 HOURS PRN
Status: DISCONTINUED | OUTPATIENT
Start: 2019-12-20 | End: 2019-12-20

## 2019-12-20 RX ORDER — ACETAMINOPHEN 325 MG/1
650 TABLET ORAL EVERY 4 HOURS PRN
Status: DISCONTINUED | OUTPATIENT
Start: 2019-12-20 | End: 2019-12-20 | Stop reason: HOSPADM

## 2019-12-20 RX ORDER — BISACODYL 10 MG
10 SUPPOSITORY, RECTAL RECTAL DAILY PRN
Status: DISCONTINUED | OUTPATIENT
Start: 2019-12-20 | End: 2019-12-20 | Stop reason: HOSPADM

## 2019-12-20 RX ADMIN — ISOSORBIDE MONONITRATE 30 MG: 30 TABLET, EXTENDED RELEASE ORAL at 12:52

## 2019-12-20 RX ADMIN — DIAZEPAM 2 MG: 2 TABLET ORAL at 11:17

## 2019-12-20 RX ADMIN — ASPIRIN 81 MG 324 MG: 81 TABLET ORAL at 06:40

## 2019-12-20 RX ADMIN — IOPAMIDOL 69 ML: 755 INJECTION, SOLUTION INTRAVENOUS at 08:06

## 2019-12-20 RX ADMIN — SODIUM CHLORIDE 88 ML: 9 INJECTION, SOLUTION INTRAVENOUS at 08:06

## 2019-12-20 ASSESSMENT — ENCOUNTER SYMPTOMS
DIAPHORESIS: 0
SHORTNESS OF BREATH: 1
FEVER: 0
BLOOD IN STOOL: 0
DIZZINESS: 1
COUGH: 0
PALPITATIONS: 1

## 2019-12-20 ASSESSMENT — MIFFLIN-ST. JEOR: SCORE: 1784.29

## 2019-12-20 NOTE — PROGRESS NOTES
"PRIMARY DIAGNOSIS: CHEST PAIN  OUTPATIENT/OBSERVATION GOALS TO BE MET BEFORE DISCHARGE:  1. Ruled out acute coronary syndrome (negative or stable Troponin):  Yes  2. Pain Status: Only has chest pain when patient moves around in room.   3. Appropriate provocative testing performed: Yes  - Stress Test Procedure: NA   - Interpretation of cardiac rhythm per telemetry tech: SR HR 70's-80's    4. Cleared by Consultants (if applicable):No  5. Return to near baseline physical activity: Yes  Discharge Planner Nurse   Safe discharge environment identified: Yes  Barriers to discharge: Yes       Entered by: Pita Sanchez 12/20/2019 1:13 PM     Please review provider order for any additional goals.   Nurse to notify provider when observation goals have been met and patient is ready for discharge.  Patient is alert and oriented x4. BP elevated and PA aware. Lung sounds clear in all lobes and patient is on RA. Denies SOB. Active bowel sounds in all 4 quadrants with LBM yesterday. Denies pain, urgency, and frequency when voiding. Patient denies any chest pain at rest, but as soon as patient stands up or tries to walk around the chest pain comes back (midsternal) and PA updated. Patient was given Valium when he first got on obs. Valium helped relax patient. Imdur started and given to patient. Patient denies any numbness or tingling. Denies nausea. Trops neg x2. Plan: Trops, tele, trial Imdur and resume Diltiazem.   BP (!) 162/96 (BP Location: Right arm)   Pulse 71   Temp 98  F (36.7  C) (Oral)   Resp 18   Ht 1.753 m (5' 9\")   Wt 95.4 kg (210 lb 4.8 oz)   SpO2 98%   BMI 31.06 kg/m      "

## 2019-12-20 NOTE — PHARMACY-ADMISSION MEDICATION HISTORY
Admission medication history interview status for this patient is complete. See [facility] admission navigator for allergy information, prior to admission medications and immunization status.     Medication history interview source(s):Patient  Medication history resources (including written lists, pill bottles, clinic record): Care Everywhere records  [facility]    Changes made to PTA medication list:  Added:  Diltiazem  Deleted:  Norvasc  Changed:  none    Actions taken by pharmacist (provider contacted, etc):None     Additional medication history information:None    Medication reconciliation/reorder completed by provider prior to medication history?  No     Do you take OTC medications (eg tylenol, ibuprofen, fish oil, eye/ear drops, etc)? Yes     For patients on insulin therapy: No      Prior to Admission medications    Medication Sig Last Dose Taking? Auth Provider   atorvastatin (LIPITOR) 10 MG tablet Take 10 mg by mouth every morning  12/19/2019 at  am Yes Reported, Patient   diltiazem ER COATED BEADS (CARDIZEM CD/CARTIA XT) 120 MG 24 hr capsule Take 120 mg by mouth every morning 12/19/2019 at  am Yes Unknown, Entered By History   metoprolol succinate ER (TOPROL-XL) 50 MG 24 hr tablet Take 50 mg by mouth daily 12/19/2019 at am Yes Unknown, Entered By History   multivitamin w/minerals (THERA-VIT-M) tablet Take 1 tablet by mouth daily Past Month at Unknown time Yes Unknown, Entered By History   omeprazole (PRILOSEC) 20 MG DR capsule Take 20 mg by mouth daily 12/19/2019 at am Yes Unknown, Entered By History

## 2019-12-20 NOTE — PLAN OF CARE
ROOM # 212-2    Living Situation (if not independent, order SW consult): Lives with wife   Facility name:  : Sherri (wife) [phone]    Activity level at baseline: Independent   Activity level on admit: Independent       Patient registered to observation; given Patient Bill of Rights; given the opportunity to ask questions about observation status and their plan of care.  Patient has been oriented to the observation room, bathroom and call light is in place.    Discussed discharge goals and expectations with patient/family.

## 2019-12-20 NOTE — PROGRESS NOTES
Patient's After Visit Summary was reviewed with patient and/or family.   Patient verbalized understanding of After Visit Summary, recommended follow up and was given an opportunity to ask questions.   Discharge medications sent home with patient/family: Prescription sent to preferred pharmacy  Discharged with son    OBSERVATION patient END time: 5137

## 2019-12-20 NOTE — DISCHARGE SUMMARY
[facility]    Discharge Summary  Hospitalist    Date of Admission:  12/20/2019  Date of Discharge:  No discharge date for patient encounter.  Discharging Provider: Vee Stratton PA-C  Date of Service (when I saw the patient): 12/20/19    Discharge Diagnoses   Atypical chest pain  BPPV  Dizziness      History of Present Illness   Yoseph Cook is a 54 year old male with history of BPPV, hemochromatosis, who presented with about 1 week of intermittent chest pressure lasting several seconds and going away on its own.  Prior to presenting to the emergency department he woke up with this discomfort in his chest and felt that it was worse than it had been previously.  He additionally complained of return of his vertigo symptoms.  He has had these symptoms in the past, underwent work up with concern for NSTEMI in July here at [facility] where he was evaluated with coronary angiogram that ended up showing no signs of fixed plaque or significant CAD.  He follows at [facility], recently switched from metoprolol to Cardizem due to concerns for potential coronary artery vasospasms.    On arrival to ED hemodynamically stable. EKG and troponin negative. The patient was admitted to observation for further monitoring.    Hospital Course   Yoseph Cook was admitted on 12/20/2019.  The following problems were addressed during his hospitalization:    Atypical chest discomfort  History and work up less consistent at this time for an acute ischemic coronary process. I suspect that his symptoms may be related to anxiety, vertigo, known potential coronary vasospasm.   This is not a new complaint for the patient. Upon chart review he has discussed these symptoms with his [facility] Cardiologist, thought to be related to potential coronary vasospasm. He was switched him to Diltiazem from metoprolol in attempt to improve this. At this time would hold off on further ischemic testing.  -While in observation tried Imdur,  with symptom improvement discharge the patient with this medication  -Advised the patient to check his blood pressure at home, as his wife is a nurse.  I gave him hold parameters regarding his blood pressure if it is too low with introduction of this medication  -Advised close follow-up with his [facility] cardiologist         History of elevated troponin without interventions on angiogram thought to be coronary vasospasm  Angiogram 07/2019 after trivial NSTEMI interpreted as likely normal without significant plaque. Nuclear stress within [facility] system negative for ischemia. TTE documented as normal within [facility] system updated in 2018.  Patient presented intially with atypical chest pain symptoms in July 2019 ultimately underwent coronary angiogram in July 2019 seen by Dr. Palmer in consultation who noted that it was difficult to say whether the patient had a true non-ST elevation MI given the lack of findings on angiogram.  The patient now follows with [facility], who additionally is treating him for vasospastic coronary artery type symptoms.         Vertigo, BPPV  Currently endorsing vertigo symptom including dizziness with ambulation, seems to coincide with his atypical symptoms of chest discomfort. These improved after Valium.  Has been treated successfully in October with physical therapy, vestibular rehab for this.  Prior MRI and MRA of his head negative for acute pathology of symptoms will hold off on repeating this imaging given history and normal neurologic exam.   -recommend outpatient close follow up with vestibular therapy     History of paroxysmal atrial fibrillation  Currently in sinus rhythm.  Upon chart review patient had an episode of remote atrial fibrillation.  Investigated in 2018 with cardiac monitor that did not show any atrial fibrillation.     History of Hemochromatosis   Appears stable.  Normal liver enzymes.     ARCHIE  Resume Cpap as tolerated.        Pending Results   None.    Code Status  "  Full Code       Primary Care Physician   Son Brian Pizarro      INTERVAL HISTORY  Patient has had some episodes of discomfort with increased awareness of heartbeat. These occurred while up standing in his room. These symptoms resolved and did not return after several hours in observation.  The patient was able to ambulate without chest pain or dyspnea. Subsequent troponins negative. Hemodynamically stable. No shortness of breath, hypoxia, cough. No headaches. Dizziness resolved. No weakness. Tolerating normal diet. No nausea, diaphoresis.     /70 (BP Location: Right arm)   Pulse 69   Temp 98  F (36.7  C) (Oral)   Resp 16   Ht 1.753 m (5' 9\")   Wt 95.4 kg (210 lb 4.8 oz)   SpO2 97%   BMI 31.06 kg/m      Constitutional: Awake, alert, no apparent distress  Respiratory:  Normal work of breathing. Lungs clear to auscultation bilaterally, no crackles or wheezing.  Cardiovascular: Regular rate and rhythm, normal S1 and S2, and no murmur appreciated.   GI: Bowel sounds present. soft, non-distended, non-tender.   Skin/Integument: Warm, dry. no peripheral edema.  Neuro: No focal deficits. Moving all extremities with normal strength. Coordination and sensation grossly intact. Speech clear. No focal deficits.   Psych: Appropriate affect.        Discharge Disposition   Discharged to home  Condition at discharge: Stable    Consultations This Hospital Stay   None    Time Spent on this Encounter   Vee ACE PA-C, personally saw the patient today and spent greater than 30 minutes discharging this patient.    Discharge Orders      Activity    Your activity upon discharge: activity as tolerated     Reason for your hospital stay    You were admitted to the observation unit for chest pain. Your heart was monitored with no abnormal findings. Your cardiac enzymes were negative for heart attack. Other possibilities include reflux, stress\anxiety, vertigo symptoms, and musculoskeletal strain. We recommend you " follow up with your Cardiologist if you continue to have pain.     When to contact your care team    Call your Cardiologist if you have any of the following:  increased shortness of breath or increased pain.     Discharge Instructions    You will start Imdur daily for blood pressure/chest discomfort, please recheck your blood pressure at home when you start this medication as it can lower this. Do not take if the systolic number is under <120.  You will take this in addition to your Diltiazem.     Follow-up and recommended labs and tests     Schedule an appointment for treatment at the vestibular clinic within 7-14 days as I believe that treating your vertigo symptoms has helped.   Follow up with your Cardiologist at [facility] in 2-3 weeks time or as needed to evaluated medication change and discuss your symptoms. Please contact them sooner (or come to the ED) if you have worsening shortness of breath or increased pain.     Diet    Follow this diet upon discharge: Regular     Discharge Medications   Current Discharge Medication List      START taking these medications    Details   isosorbide mononitrate (IMDUR) 30 MG 24 hr tablet Take 1 tablet (30 mg) by mouth daily  Qty: 30 tablet, Refills: 0    Comments: Future refills by PCP Dr. Rashard Pizarro with phone number [phone].  Associated Diagnoses: Chest discomfort         CONTINUE these medications which have NOT CHANGED    Details   atorvastatin (LIPITOR) 10 MG tablet Take 10 mg by mouth every morning       diltiazem ER COATED BEADS (CARDIZEM CD/CARTIA XT) 120 MG 24 hr capsule Take 120 mg by mouth every morning      multivitamin w/minerals (THERA-VIT-M) tablet Take 1 tablet by mouth daily      omeprazole (PRILOSEC) 20 MG DR capsule Take 20 mg by mouth daily         STOP taking these medications       metoprolol succinate ER (TOPROL-XL) 50 MG 24 hr tablet Comments:   Reason for Stopping:             Allergies   Allergies   Allergen Reactions     No Known Allergies       Data     Results for orders placed or performed during the hospital encounter of 12/20/19 (from the past 24 hour(s))   EKG 12 lead   Result Value Ref Range    Interpretation ECG Click View Image link to view waveform and result    CBC with platelets differential   Result Value Ref Range    WBC 4.8 4.0 - 11.0 10e9/L    RBC Count 5.10 4.4 - 5.9 10e12/L    Hemoglobin 17.2 13.3 - 17.7 g/dL    Hematocrit 47.0 40.0 - 53.0 %    MCV 92 78 - 100 fl    MCH 33.7 (H) 26.5 - 33.0 pg    MCHC 36.6 (H) 31.5 - 36.5 g/dL    RDW 11.4 10.0 - 15.0 %    Platelet Count 189 150 - 450 10e9/L    Diff Method Automated Method     % Neutrophils 43.9 %    % Lymphocytes 42.2 %    % Monocytes 11.2 %    % Eosinophils 1.9 %    % Basophils 0.6 %    % Immature Granulocytes 0.2 %    Nucleated RBCs 0 0 /100    Absolute Neutrophil 2.1 1.6 - 8.3 10e9/L    Absolute Lymphocytes 2.0 0.8 - 5.3 10e9/L    Absolute Monocytes 0.5 0.0 - 1.3 10e9/L    Absolute Eosinophils 0.1 0.0 - 0.7 10e9/L    Absolute Basophils 0.0 0.0 - 0.2 10e9/L    Abs Immature Granulocytes 0.0 0 - 0.4 10e9/L    Absolute Nucleated RBC 0.0    Troponin I   Result Value Ref Range    Troponin I ES <0.015 0.000 - 0.045 ug/L   Comprehensive metabolic panel   Result Value Ref Range    Sodium 141 133 - 144 mmol/L    Potassium 3.7 3.4 - 5.3 mmol/L    Chloride 108 94 - 109 mmol/L    Carbon Dioxide 26 20 - 32 mmol/L    Anion Gap 7 3 - 14 mmol/L    Glucose 113 (H) 70 - 99 mg/dL    Urea Nitrogen 24 7 - 30 mg/dL    Creatinine 0.97 0.66 - 1.25 mg/dL    GFR Estimate 88 >60 mL/min/[1.73_m2]    GFR Estimate If Black >90 >60 mL/min/[1.73_m2]    Calcium 8.5 8.5 - 10.1 mg/dL    Bilirubin Total 0.5 0.2 - 1.3 mg/dL    Albumin 4.1 3.4 - 5.0 g/dL    Protein Total 7.8 6.8 - 8.8 g/dL    Alkaline Phosphatase 71 40 - 150 U/L    ALT 52 0 - 70 U/L    AST 34 0 - 45 U/L   D dimer quantitative   Result Value Ref Range    D Dimer 0.7 (H) 0.0 - 0.50 ug/ml FEU   Creatinine POCT   Result Value Ref Range    Creatinine 1.1 0.66  - 1.25 mg/dL    GFR Estimate 70 >60 mL/min/[1.73_m2]    GFR Estimate If Black 84 >60 mL/min/[1.73_m2]   CT Chest Pulmonary Embolism w Contrast    Narrative    CT CHEST PULMONARY EMBOLISM W CONTRAST 12/20/2019 8:14 AM    HISTORY: PE suspected, intermediate prob, positive D-dimer    TECHNIQUE: CT of the chest is performed with IV contrast per pulmonary  embolus protocol. 69mL Isovue-[id number] is utilized intravenously.   Radiation dose for this scan was reduced using automated exposure  control, adjustment of the mA and/or kV according to patient size, or  iterative reconstruction technique.    This study is particularly tailored to assess the pulmonary arteries  and their branch vessels.  Other assessed structures include the  lungs, mediastinum, pleura, and chest wall.    COMPARISON: None.    FINDINGS:    Lung parenchyma: There are 2 subcentimeter nodules in the peripheral  right lower lobe measuring 5 mm (series 6 image 79) and 3 mm (series 6  image 85.   Pleural effusions:None.  Pneumothorax:None.    Pulmonary arteries:There are no filling defects within the bilateral  pulmonary arteries to suggest significant pulmonary embolus.    Heart:Unremarkable.  Aorta:Normal.    Lymphadenopathy:No axillary, mediastinal, or hilar lymphadenopathy  appreciated.    Miscellaneous findings:None.      Impression    IMPRESSION:    1. No CT evidence of acute pulmonary embolus.  2. Subcentimeter nodules in the peripheral right lower lobe, as above.      CAMERON CHONG MD   EKG 12-lead, tracing only   Result Value Ref Range    Interpretation ECG Click View Image link to view waveform and result    Troponin I   Result Value Ref Range    Troponin I ES <0.015 0.000 - 0.045 ug/L   Troponin I (STAT)   Result Value Ref Range    Troponin I ES <0.015 0.000 - 0.045 ug/L       Vee HILLC  [facility]

## 2019-12-20 NOTE — H&P
[facility] [place]  Outpatient/Observation Unit  Admission History and Physical Examination    NAME: Yoseph Cook   : 1965   MRN: [id number]     Date of Admission:  2019    Assessment & Plan   Yoseph Cook is a 54 year old male with history of BPPV, hemochromatosis, who presents with about 1 week of intermittent chest pressure lasting several seconds and going away on its own. This morning he woke with this sensation. It was associated with increased awareness of his heart beat. Additionally endorsing vertigo symptoms.  On arrival to ED hemodynamically stable. EKG and troponin negative. The patient was admitted to observation for further monitoring, ACS rule out.      Atypical chest discomfort  History and work up less consistent at this time for an acute ischemic coronary process. I suspect that his symptoms may be related to anxiety, vertigo, known potential coronary vasospasm.   This is not a new complaint for the patient. Upon chart review he has discussed these symptoms with his [facility]ologist, thought to be related to potential coronary vasospasm. He was switched him to Diltiazem in attempt to improve this. At this time would hold off on further ischemic testing, treat his vertigo symptoms to evaluate for improvement.  -Trend troponin   -Cardiac Telemetry    -could consider trial of Imdur, resume Diltiazem     History of elevated troponin without interventions on angiogram thought to be coronary vasospasm  Angiogram 2019 after trivial NSTEMI interpreted as likely normal without significant plaque. Nuclear stress within [facility] system negative for ischemia. TTE documented as normal within [facility] system updated in 2018.  Patient presented intially with atypical chest pain symptoms in 2019 ultimately underwent coronary angiogram in 2019 seen by Dr. Palmer in consultation who noted that it was difficult to say whether the patient had a true non-ST elevation MI given the lack  of findings on angiogram.  The patient now follows with [facility], who additionally is treating him for vasospastic coronary artery type symptoms.       Vertigo, BPPV  Currently endorsing vertigo symptom including dizziness with ambulation, seems to coincide with his atypical symptoms of chest discomfort.   Has been treated successfully in October with physical therapy, vestibular rehab for this.  Prior MRI and MRA of his head negative for acute pathology of symptoms will hold off on repeating this imaging given history and normal neurologic exam.   -trial of Valium for symptom improvement  -recommend outpatient follow up to resume vestibular therapy    History of paroxysmal atrial fibrillation  Currently in sinus rhythm.  Upon chart review patient had an episode of remote atrial fibrillation.  Investigated in 2018 with cardiac monitor that did not show any atrial fibrillation.    History of Hemochromatosis   Appears stable.  Normal liver enzymes.    ARCHIE  -Cpap as tolerated    DVT Prophylaxis: Ambulate every shift  Code Status: Full Code  Expected discharge: Tomorrow, recommended to prior living arrangement     Vee Stratton PA-C    Primary Care Physician   *Son Brian Pizarro    Chief Complaint   Chest discomfort    History is obtained from the patient    Discussed with Dr. Lam in the ED, full chart review including lab work, imaging, and vital signs were reviewed. Patient received aspirin in the ED. He was evaluated in the ED for PE with CT scan which was also negative, given mildly elevated d-dimer of 0.7.      The patient was admitted to observation status for further work-up and monitoring.    History of Present Illness   Yoseph Cook is a 54 year old male who presents with chest discomfort.  He reports that this morning around 4 AM he woke up and noticed heaviness located in the center of his chest.  He describes the heaviness as dull, it is intermittent.  The discomfort does not radiate  "anywhere. He notes that it lasts for several seconds and goes away on its own.  He has had been having this sensation on and off for about a week, and before this unable to quantify. He notes that it comes on when he is walking, or resting.  He exercised at the gym telling me that he has heart rate up to 130 yesterday without difficulty. He tells me that he decided to come in this morning because he noted the symptoms were similar to when he presented in July 2019 and then underwent an angiogram saying he had an \"MI\".    He has no dyspnea, orthopnea, change in exercise tolerance.  He has had no nausea, diaphoresis.  He has no cough, fevers, chills.    Additionally over the past week he has noticed return of his vertigo symptoms. Every time he ambulates he becomes dizzy. He has not tried anything to relieve his vertigo symptoms, notes that it has been successfully treated with physical therapy in the past. He has no hearing or vision changes. No emesis, nausea, headaches.       Past Medical History    I have reviewed this patient's medical history and updated it with pertinent information if needed.   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  Past Surgical History:   Procedure Laterality Date     CV CORONARY ANGIOGRAM N/A 7/12/2019    Procedure: Coronary Angiogram;  Surgeon: Vishal Crews MD;  Location:  HEART CARDIAC CATH LAB     CV LEFT HEART CATH N/A 7/12/2019    Procedure: Left Heart Cath;  Surgeon: Vishal Crews MD;  Location:  HEART CARDIAC CATH LAB     CV LEFT VENTRICULOGRAM N/A " 7/12/2019    Procedure: Left Ventriculogram;  Surgeon: Vishal Crews MD;  Location:  HEART CARDIAC CATH LAB     HC COLONOSCOPY THRU STOMA, DIAGNOSTIC  1999    normal, done for lower GI/rectal bleed       Prior to Admission Medications   Prior to Admission Medications   Prescriptions Last Dose Informant Patient Reported? Taking?   albuterol (PROAIR HFA/PROVENTIL HFA/VENTOLIN HFA) 108 (90 BASE) MCG/ACT Inhaler   No No   Sig: Inhale 2 puffs into the lungs every 6 hours as needed for shortness of breath / dyspnea or wheezing   amLODIPine (NORVASC) 10 MG tablet 12/19/2019 at Unknown time  Yes Yes   Sig: Take 10 mg by mouth daily   atorvastatin (LIPITOR) 10 MG tablet 12/19/2019 at Unknown time  Yes Yes   Sig: Take 10 mg by mouth daily   metoprolol succinate ER (TOPROL-XL) 50 MG 24 hr tablet 12/19/2019 at Unknown time  Yes Yes   Sig: Take 50 mg by mouth daily   multivitamin w/minerals (THERA-VIT-M) tablet   Yes No   Sig: Take 1 tablet by mouth daily   omeprazole (PRILOSEC) 20 MG DR capsule 12/19/2019 at Unknown time  Yes Yes   Sig: Take 20 mg by mouth daily      Facility-Administered Medications: None     Allergies   Allergies   Allergen Reactions     No Known Allergies        Social History   I have reviewed this patient's social history.  He is a former tobacco smoker, quit in 2003 his usage was for 12 years.  He is a daily alcohol drinker 1 to 2 glasses of beer or wine.  No other substance use reported. He is .    Family History   I have reviewed this patient's family history and updated it with pertinent information if needed.   Family History   Problem Relation Age of Onset     Hypertension Father      C.A.D. Father      Neurologic Disorder Father         Parkinsons     Lipids Father      Depression Father      Lipids Brother      Breast Cancer Mother      Eye Disorder Mother         macular degeneration     Depression Mother      Osteoporosis Mother      Diabetes Brother      Gastrointestinal Disease  Sister      Thyroid Disease Sister         CA     Psychotic Disorder Sister         mental illness     Osteoporosis Sister        Review of Systems   The 10 point Review of Systems is negative other than noted in the HPI or here.     Physical Exam   Temp: 98.7  F (37.1  C) Temp src: Oral BP: (!) 140/80 Pulse: 70 Heart Rate: 68 Resp: 16 SpO2: 94 %      Vital Signs with Ranges  Temp:  [97.9  F (36.6  C)-98.7  F (37.1  C)] 98.7  F (37.1  C)  Pulse:  [66-85] 70  Heart Rate:  [65-78] 68  Resp:  [16] 16  BP: (124-181)/() 140/80  SpO2:  [93 %-98 %] 94 %  0 lbs 0 oz    Constitutional: Awake, alert,  no apparent distress.  Eyes: Conjunctiva and pupils examined and normal.  HEENT: Moist mucous membranes, normal dentition.  Respiratory: Clear to auscultation bilaterally, no crackles or wheezing.  Cardiovascular: Regular rate and rhythm, normal S1 and S2, and no murmur noted.  GI: Soft, non-distended, non-tender, bowel sounds present. No rebound tenderness or guarding.  Lymph/Hematologic: No anterior cervical or supraclavicular adenopathy.  Skin: No rashes, no cyanosis, no edema.  Musculoskeletal: No deformities noted.  No erythema or tenderness. Moving all extremities.  Neurologic: No focal deficits noted. Speech is clear. Coordination and strength grossly normal.   Psychiatric: Appropriate affect.    Data   Data reviewed today:      EKG: Sinus rhythm, no acute ST segment changes.   Imaging:   Recent Results (from the past 24 hour(s))   CT Chest Pulmonary Embolism w Contrast    Narrative    CT CHEST PULMONARY EMBOLISM W CONTRAST 12/20/2019 8:14 AM    HISTORY: PE suspected, intermediate prob, positive D-dimer    TECHNIQUE: CT of the chest is performed with IV contrast per pulmonary  embolus protocol. 69mL Isovue-370 is utilized intravenously.   Radiation dose for this scan was reduced using automated exposure  control, adjustment of the mA and/or kV according to patient size, or  iterative reconstruction  technique.    This study is particularly tailored to assess the pulmonary arteries  and their branch vessels.  Other assessed structures include the  lungs, mediastinum, pleura, and chest wall.    COMPARISON: None.    FINDINGS:    Lung parenchyma: There are 2 subcentimeter nodules in the peripheral  right lower lobe measuring 5 mm (series 6 image 79) and 3 mm (series 6  image 85.   Pleural effusions:None.  Pneumothorax:None.    Pulmonary arteries:There are no filling defects within the bilateral  pulmonary arteries to suggest significant pulmonary embolus.    Heart:Unremarkable.  Aorta:Normal.    Lymphadenopathy:No axillary, mediastinal, or hilar lymphadenopathy  appreciated.    Miscellaneous findings:None.      Impression    IMPRESSION:    1. No CT evidence of acute pulmonary embolus.  2. Subcentimeter nodules in the peripheral right lower lobe, as above.      CAMERON CHONG MD       Recent Labs   Lab 12/20/19  0622   WBC 4.8   HGB 17.2   MCV 92         POTASSIUM 3.7   CHLORIDE 108   CO2 26   BUN 24   CR 0.97   ANIONGAP 7   TIFFANY 8.5   *   ALBUMIN 4.1   PROTTOTAL 7.8   BILITOTAL 0.5   ALKPHOS 71   ALT 52   AST 34   TROPI <0.015           Signed:  Vee Stratton PA-C  December 20, 2019 at 9:44 AM

## 2019-12-20 NOTE — ED PROVIDER NOTES
"  History     Chief Complaint:  Chest Pain      HPI   Yoseph Cook is a 54 year old male with a history of atrial fibrillation and NSTEMI who presents with chest pain. Of note, the patient had an NSTEMI in July with mildly elevated troponin, and angiogram was completed. Over the past four days the patient has been experiencing chest pain with exertion that is similar to his chest pain in July. The chest pain has been coming and going that resolves independently but may improve at rest. The patient woke up this morning at 0530 to sudden chest pain located in the center of his chest that does not radiate. Currently, his chest pain is rated as a 2/10 but as he was walking in to the Emergency Department the pain was worse. During these episodes he notes that it feels as if his \"heart was beating out of his chest\" along with shortness of breath. He endorses dizziness, as if the room is spinning over the past week. He denies diaphoresis, cough, fever, blood in stool, edema, leg pain, heart burn, or urinary problems.     Allergies:  No known allergies    Medications:    Metoprolol  Prilosec   Amlodipine     Past Medical History:    Adjustment disorder  Atrial fibrillation   Cervicalgia  Colitis  Diverticulitis  NSTEMI    Past Surgical History:    CV coronary angiogram  Left heart catheterization  Ventriculogram    Family History:    Father: hypertension, coronary artery disease, Parkinson's, hyperlipidemia   Mother: breast cancer, macular degeneration  Brother: diabetes   Sister: Thyroid disease     Social History:  The patient was accompanied to the ED by wife.  Smoking Status: Former Smoker  Smokeless Tobacco: Never Used  Alcohol Use: Yes  Drug Use: No  PCP: Rashard Pizarro  Marital Status:      Review of Systems   Constitutional: Negative for diaphoresis and fever.   Respiratory: Positive for shortness of breath. Negative for cough.    Cardiovascular: Positive for chest pain and palpitations. Negative for leg " swelling.   Gastrointestinal: Negative for blood in stool.   Genitourinary:        Denies urinary problems   Neurological: Positive for dizziness.       Physical Exam     Patient Vitals for the past 24 hrs:   BP Temp Temp src Pulse Heart Rate Resp SpO2   12/20/19 0715 131/75 -- -- 71 73 -- 96 %   12/20/19 0700 136/75 -- -- 69 65 -- 95 %   12/20/19 0645 124/88 -- -- 80 74 -- 95 %   12/20/19 0624 -- 98.7  F (37.1  C) Oral -- -- -- --   12/20/19 0620 (!) 160/100 -- -- 81 -- -- --   12/20/19 0613 (!) 181/104 97.9  F (36.6  C) -- 82 -- 16 98 %         Physical Exam  Nursing note and vitals reviewed.    Constitutional: Pleasant and well groomed.          HENT:    Mouth/Throat: Oropharynx is without swelling or erythema. Oral mucosa moist.    Eyes: Conjunctivae are normal. No scleral icterus.    Neck: Neck supple.   Cardiovascular: Normal rate, regular rhythm, no murmur and intact distal pulses.   Pulmonary/Chest: Effort normal and breath sounds normal.   Abdominal: Soft.  No distension. There is no tenderness.   Musculoskeletal:  No edema, No calf tenderness  Neurological:Alert. Coordination normal.   Skin: Skin is warm and dry.   Psychiatric: Normal mood and affect.       Emergency Department Course   ECG:  ECG taken at 0619, ECG read at 0920  Normal sinus rhythm  Normal ECG  No significant changes noted to EKG 7/11/2019  Rate 84 bpm. HI interval 164 ms. QRS duration 100 ms. QT/QTc 354/418 ms. P-R-T axes 57 30 49.    Imaging:  Radiology findings were communicated with the patient who voiced understanding of the findings.    CT Chest Pulmonary Embolism w Contrast  1. No CT evidence of acute pulmonary embolus.  2. Subcentimeter nodules in the peripheral right lower lobe, as above.  Reading per radiology      Laboratory:  Laboratory findings were communicated with the patient who voiced understanding of the findings.    Creatinine POCT (Collected 0644): 1.1  CBC: WBC 4.8, HGB 17.2,   CMP: glucose 113 (H) o/w WNL  (Creatinine 0.97)  Troponin (Collected 0622): <0.015  D Dimer (Collected 0622): 0.7 (H)    Interventions:  0640 Aspirin 324 mg oral    Emergency Department Course:  Nursing notes and vitals reviewed.    0623 I performed an exam of the patient as documented above.     0718 I returned to update the patient.     IV was inserted and blood was drawn for laboratory testing, results above.    The patient was sent for a CT chest pulmonary embolism with contrast while in the emergency department, results above.     0840 I returned to update the patient. He is comfortable with admission and understands importance of follow up for pulmonary nodules.     0858  I spoke with MITCHEL Stratton for Dr. Guillen of the Hospitalist service from [facility] regarding patient's presentation, findings, and plan of care.    Findings and plan explained to the Patient who consents to admission. Discussed the patient with Elana Stratton PA-C for Dr. Guillen who will admit the patient to a observation bed for further monitoring, evaluation, and treatment.    Impression & Plan      Medical Decision Making:  Yoseph Cook is a 54 year old male who presents to the emergency department today for evaluation of chest pain as described above. Differential diagnosis included but was not limited to acute coronary syndrome, STEMI, non STEMI, pulmonary embolism, pneumonia, less likely gastritis or gastroesophageal reflux disease. Emergency Department evaluation is as noted above. Initial troponin and EKG were not diagnostic for a NSTEMI. His D-dimer was elevated prompting a CT scan. This returned negative for pulmonary embolism  And was otherwise remarkable for pulmonary nodules that were discussed with the patient as well as importance with follow up images with his primary care provider. Due to his elevated HEART score and the quickness in which he presented to the Emergency Department additional testing and monitoring is indicated; therefore he  will be transferred to observation is indicated for continued evaluation and management. At this point he is asymptomatic and has received an aspirin in the Emergency Department.       HEART Score  Background  Calculates the overall risk of adverse event in patient's presenting with chest pain.  Based on 5 criteria (each assigned 0-2 points) including suspiciousness of history, EKG, age, risk factors and troponin.    Data  54 year old male  has Benign neoplasm of other specified sites of skin; Other specified disease of hair and hair follicles; Regional enteritis of small intestine (H); Adjustment disorder with mixed anxiety and depressed mood; CARDIOVASCULAR SCREENING; LDL GOAL LESS THAN 160; and NSTEMI (non-ST elevated myocardial infarction) (H) on their problem list.   reports that he has quit smoking. He has never used smokeless tobacco.  family history includes Breast Cancer in his mother; C.A.D. in his father; Depression in his father and mother; Diabetes in his brother; Eye Disorder in his mother; Gastrointestinal Disease in his sister; Hypertension in his father; Lipids in his brother and father; Neurologic Disorder in his father; Osteoporosis in his mother and sister; Psychotic Disorder in his sister; Thyroid Disease in his sister.  Lab Results   Component Value Date    TROPI <0.015 12/20/2019     Criteria   0-2 points for each of 5 items (maximum of 10 points):  Score 1- History moderately suspicious for coronary syndrome  Score 0- EKG Normal  Score 1- Age 45 to 65 years old  Score 2- Three or more risk factors for or history of atherosclerotic disease  Score 0- Within normal limits for troponin levels  Interpretation  Risk of adverse outcome  Heart Score: 4  Total Score 4-6- Adverse Outcome Risk 20.3% - Supports admission with standard rule-out management -serial troponins and stress testing        Diagnosis:    ICD-10-CM    1. Chest pain, unspecified type R07.9    2. Pulmonary nodules R91.8         Disposition:   The patient is admitted into the care of Elana Stratton for Dr. Guillen.      Scribe Disclosure:  I, Bri Don, am serving as a scribe at 6:16 AM on 12/20/2019 to document services personally performed by Isabel Lam M.D. based on my observations and the provider's statements to me.     [facility] EMERGENCY DEPARTMENT       Isabel aLm MD  12/23/19 1041

## 2019-12-20 NOTE — ED TRIAGE NOTES
Pt in with C/O mid-sternal chest pain onset this morning. Pt reports pain woke him up. In addition pt feels SOB. Hx of N-STEMI in July. Pt A&Ox4 ABCD's intact

## 2019-12-20 NOTE — ED NOTES
RECEIVING UNIT ED HANDOFF REVIEW    Above ED Nurse Handoff Report was reviewed: Yes  Reviewed by: Zuleika Quintana RN on December 20, 2019 at 9:45 AM           [facility]  ED Nurse Handoff Report    Yoseph Cook is a 54 year old male   ED Chief complaint: Chest Pain  . ED Diagnosis:   Final diagnoses:   Chest pain, unspecified type   Pulmonary nodules     Allergies:   Allergies   Allergen Reactions     No Known Allergies        Code Status: Full Code  Activity level - Baseline/Home:  Independent. Activity Level - Current:   Independent. Lift room needed: No. Bariatric: No   Needed: No   Isolation: No. Infection: Not Applicable.     Vital Signs:   Vitals:    12/20/19 0745 12/20/19 0815 12/20/19 0830 12/20/19 0845   BP: (!) 145/69 (!) 141/83 135/79 (!) 140/80   Pulse: 66 71 85 70   Resp:       Temp:       TempSrc:       SpO2: 95% 97% 95% 94%       Cardiac Rhythm:  ,   Cardiac  Cardiac Rhythm: Normal sinus rhythm  Pain level: 0-10 Pain Scale: 0  Patient confused: No. Patient Falls Risk: No.   Elimination Status:   Patient Report - Initial Complaint: Pt in with C/O mid-sternal chest pain onset this morning. Pt reports pain woke him up. In addition pt feels SOB. Hx of N-STEMI in July. Pt A&Ox4 ABCD's intact. Focused Assessment: C/o intermittent CP. Currently, no CP or SOB. No n/v or diaphoresis.   Tests Performed: labs, CT. Abnormal Results:   Labs Ordered and Resulted from Time of ED Arrival Up to the Time of Departure from the ED   CBC WITH PLATELETS DIFFERENTIAL - Abnormal; Notable for the following components:       Result Value    MCH 33.7 (*)     MCHC 36.6 (*)     All other components within normal limits   COMPREHENSIVE METABOLIC PANEL - Abnormal; Notable for the following components:    Glucose 113 (*)     All other components within normal limits   D DIMER QUANTITATIVE - Abnormal; Notable for the following components:    D Dimer 0.7 (*)     All other components within normal  limits   TROPONIN I   CREATININE POCT   PULSE OXIMETRY NURSING   CARDIAC CONTINUOUS MONITORING   PERIPHERAL IV CATHETER   PATIENT CARE ORDER   ISTAT CREATININE NURSING POCT     CT Chest Pulmonary Embolism w Contrast   Final Result   IMPRESSION:      1. No CT evidence of acute pulmonary embolus.   2. Subcentimeter nodules in the peripheral right lower lobe, as above.         CAMERON CHONG MD      .   Treatments provided: asa  Family Comments: wife at bedside  OBS brochure/video discussed/provided to patient:  Yes  ED Medications:   Medications   aspirin (ASA) chewable tablet 324 mg (324 mg Oral Given 12/20/19 0640)   CT Scan Flush (88 mLs Intravenous Given 12/20/19 0806)   iopamidol (ISOVUE-370) solution 500 mL (69 mLs Intravenous Given 12/20/19 0806)     Drips infusing:  No  For the majority of the shift, the patient's behavior Green. Interventions performed were n/a.     Severe Sepsis OR Septic Shock Diagnosis Present: No      ED Nurse Name/Phone Number: Gideon Locke RN,   8:50 AM

## 2019-12-23 LAB — INTERPRETATION ECG - MUSE: NORMAL

## 2021-01-14 ENCOUNTER — HEALTH MAINTENANCE LETTER (OUTPATIENT)
Age: 56
End: 2021-01-14

## 2021-02-11 NOTE — MR AVS SNAPSHOT
After Visit Summary   2/19/2018    Yoseph Cook    MRN: [id number]           Patient Information     Date Of Birth          1965        Visit Information        Provider Department      2/19/2018 12:10 PM Najma Lewis PA-C [facility]        Today's Diagnoses     Viral upper respiratory tract infection    -  1    Cough           Follow-ups after your visit        Who to contact     If you have questions or need follow up information about today's clinic visit or your schedule please contact [facility] directly at [phone].  Normal or non-critical lab and imaging results will be communicated to you by PassHathart, letter or phone within 4 business days after the clinic has received the results. If you do not hear from us within 7 days, please contact the clinic through NextCapitalt or phone. If you have a critical or abnormal lab result, we will notify you by phone as soon as possible.  Submit refill requests through Pharmalink or call your pharmacy and they will forward the refill request to us. Please allow 3 business days for your refill to be completed.          Additional Information About Your Visit        MyChart Information     Pharmalink gives you secure access to your electronic health record. If you see a primary care provider, you can also send messages to your care team and make appointments. If you have questions, please call your primary care clinic.  If you do not have a primary care provider, please call [phone] and they will assist you.        Care EveryWhere ID     This is your Care EveryWhere ID. This could be used by other organizations to access your [facility] medical records  [id number]        Your Vitals Were     Pulse Temperature Respirations Pulse Oximetry BMI (Body Mass Index)       64 97.9  F (36.6  C) (Tympanic) 20 97% 30.02 kg/m2        Blood Pressure from Last 3 Encounters:   02/19/18 104/68   01/29/18 115/78   01/02/18 117/76     Weight from Last 3 Encounters:   02/19/18 203 lb 4.8 oz (92.2 kg)   01/29/18 200 lb (90.7 kg)   01/02/18 200 lb (90.7 kg)                 Today's Medication Changes          These changes are accurate as of 2/19/18  1:14 PM.  If you have any questions, ask your nurse or doctor.               Start taking these medicines.        Dose/Directions    albuterol 108 (90 BASE) MCG/ACT Inhaler   Commonly known as:  PROAIR HFA/PROVENTIL HFA/VENTOLIN HFA   Used for:  Cough   Started by:  Najma Lewis PA-C        Dose:  2 puff   Inhale 2 puffs into the lungs every 6 hours as needed for shortness of breath / dyspnea or wheezing   Quantity:  1 Inhaler   Refills:  0       benzonatate 200 MG capsule   Commonly known as:  TESSALON   Used for:  Cough   Started by:  Najma Lewis PA-C        Dose:  200 mg   Take 1 capsule (200 mg) by mouth 3 times daily as needed for cough   Quantity:  21 capsule   Refills:  0         Stop taking these medicines if you haven't already. Please contact your care team if you have questions.     clobetasol 0.05 % external solution   Commonly known as:  TEMOVATE   Stopped by:  Najma Lewis PA-C           NIZORAL 2 % shampoo   Generic drug:  ketoconazole   Stopped by:  Najma Lewis PA-C                Where to get your medicines      These medications were sent to [id number] - [place] - [address] AT [facility]  [address] [place] [address] [address]     Phone:  [phone]     albuterol 108 (90 BASE) MCG/ACT Inhaler    benzonatate 200 MG capsule                Primary Care Provider Office Phone # Fax #    Rashard Brian Pizarro [phone]       [facility] [address] [place] [address]        Equal Access to Services     [facility]: [place], waaxda luqadaha, qaybta kaalmadrea castillo, rosemarie reyes. [facility] [phone].    ATENCIÓN: Si rock varghese,  tiene a candelario disposición servicios gratuitos de asistencia lingüística. Kori sewell [phone].    We comply with applicable federal civil rights laws and [place] laws. We do not discriminate on the basis of race, color, national origin, age, disability, sex, sexual orientation, or gender identity.            Thank you!     Thank you for choosing [facility]  for your care. Our goal is always to provide you with excellent care. Hearing back from our patients is one way we can continue to improve our services. Please take a few minutes to complete the written survey that you may receive in the mail after your visit with us. Thank you!             Your Updated Medication List - Protect others around you: Learn how to safely use, store and throw away your medicines at www.disposemymeds.org.          This list is accurate as of 2/19/18  1:14 PM.  Always use your most recent med list.                   Brand Name Dispense Instructions for use Diagnosis    albuterol 108 (90 BASE) MCG/ACT Inhaler    PROAIR HFA/PROVENTIL HFA/VENTOLIN HFA    1 Inhaler    Inhale 2 puffs into the lungs every 6 hours as needed for shortness of breath / dyspnea or wheezing    Cough       ASACOL 400 MG EC tablet   Generic drug:  mesalamine      2 TABLETS 3 TIMES DAILY    Regional enteritis of small intestine (H)       aspirin 81 MG tablet      1 TABLET DAILY    Routine general medical examination at a health care facility       benzonatate 200 MG capsule    TESSALON    21 capsule    Take 1 capsule (200 mg) by mouth 3 times daily as needed for cough    Cough       fish oil-omega-3 fatty acids 1000 MG capsule      2 tablets daily        metoprolol 10 mg/mL Susp    LOPRESSOR     Take by mouth 2 times daily        MULTI-DAY Tabs           OMEPRAZOLE PO              100

## 2021-10-23 ENCOUNTER — HEALTH MAINTENANCE LETTER (OUTPATIENT)
Age: 56
End: 2021-10-23

## 2022-02-12 ENCOUNTER — HEALTH MAINTENANCE LETTER (OUTPATIENT)
Age: 57
End: 2022-02-12

## 2022-10-10 ENCOUNTER — HEALTH MAINTENANCE LETTER (OUTPATIENT)
Age: 57
End: 2022-10-10

## 2023-03-25 ENCOUNTER — HEALTH MAINTENANCE LETTER (OUTPATIENT)
Age: 58
End: 2023-03-25

## (undated) DEVICE — DEFIB PRO-PADZ LVP LQD GEL ADULT 8900-2105-01

## (undated) DEVICE — PACK CUSTOM CATH LAB RIDGES LF PC15CCFCJ

## (undated) DEVICE — CATH DIAG 4FR ANG PIG 538453S

## (undated) DEVICE — INTRO SHEATH 4FRX10CM PINNACLE RSS402

## (undated) DEVICE — KIT HAND CONTROL ANGIOTOUCH ACIST 65CM AT-P65

## (undated) DEVICE — GUIDEWIRE VASC 0.035INX150CM INQWIRE J TIP IQ35F150J3F/A

## (undated) DEVICE — CATH ANGIO INFINITI 3DRC 4FRX100CM 538476

## (undated) DEVICE — CATH DIAG 4FR JL 4.5 538417